# Patient Record
Sex: FEMALE | Race: WHITE | NOT HISPANIC OR LATINO | Employment: OTHER | URBAN - METROPOLITAN AREA
[De-identification: names, ages, dates, MRNs, and addresses within clinical notes are randomized per-mention and may not be internally consistent; named-entity substitution may affect disease eponyms.]

---

## 2021-05-31 ENCOUNTER — RECORDS - HEALTHEAST (OUTPATIENT)
Dept: ADMINISTRATIVE | Facility: CLINIC | Age: 76
End: 2021-05-31

## 2024-10-31 ENCOUNTER — APPOINTMENT (OUTPATIENT)
Dept: CT IMAGING | Facility: CLINIC | Age: 79
DRG: 064 | End: 2024-10-31
Attending: EMERGENCY MEDICINE
Payer: COMMERCIAL

## 2024-10-31 ENCOUNTER — HOSPITAL ENCOUNTER (INPATIENT)
Facility: CLINIC | Age: 79
LOS: 3 days | Discharge: HOSPICE | DRG: 064 | End: 2024-11-03
Attending: EMERGENCY MEDICINE | Admitting: PEDIATRICS
Payer: COMMERCIAL

## 2024-10-31 ENCOUNTER — APPOINTMENT (OUTPATIENT)
Dept: CT IMAGING | Facility: CLINIC | Age: 79
DRG: 064 | End: 2024-10-31
Attending: PHYSICIAN ASSISTANT
Payer: COMMERCIAL

## 2024-10-31 DIAGNOSIS — I61.9 HEMORRHAGIC STROKE (H): ICD-10-CM

## 2024-10-31 DIAGNOSIS — Z66 DNAR (DO NOT ATTEMPT RESUSCITATION): ICD-10-CM

## 2024-10-31 DIAGNOSIS — Z51.5 COMFORT MEASURES ONLY STATUS: ICD-10-CM

## 2024-10-31 DIAGNOSIS — Z87.891 PERSONAL HISTORY OF TOBACCO USE, PRESENTING HAZARDS TO HEALTH: Primary | ICD-10-CM

## 2024-10-31 PROBLEM — D68.2 FACTOR II DEFICIENCY (H): Status: ACTIVE | Noted: 2024-10-31

## 2024-10-31 PROBLEM — F41.0 PANIC ANXIETY SYNDROME: Status: ACTIVE | Noted: 2024-10-31

## 2024-10-31 PROBLEM — J44.9 COPD (CHRONIC OBSTRUCTIVE PULMONARY DISEASE) (H): Status: ACTIVE | Noted: 2024-10-31

## 2024-10-31 PROBLEM — E78.5 HYPERLIPIDEMIA: Status: ACTIVE | Noted: 2024-10-31

## 2024-10-31 PROBLEM — G81.94 LEFT HEMIPLEGIA (H): Status: ACTIVE | Noted: 2024-10-31

## 2024-10-31 PROBLEM — E66.9 OBESITY: Status: ACTIVE | Noted: 2024-10-31

## 2024-10-31 PROBLEM — R73.9 HYPERGLYCEMIA: Status: ACTIVE | Noted: 2024-10-31

## 2024-10-31 PROBLEM — F33.40 RECURRENT MAJOR DEPRESSIVE DISORDER, IN REMISSION (H): Status: ACTIVE | Noted: 2024-10-31

## 2024-10-31 PROBLEM — T45.515A WARFARIN-INDUCED COAGULOPATHY (H): Status: ACTIVE | Noted: 2024-10-31

## 2024-10-31 PROBLEM — I71.43 INFRARENAL ABDOMINAL AORTIC ANEURYSM (AAA) WITHOUT RUPTURE (H): Status: ACTIVE | Noted: 2024-10-31

## 2024-10-31 PROBLEM — D68.32 WARFARIN-INDUCED COAGULOPATHY (H): Status: ACTIVE | Noted: 2024-10-31

## 2024-10-31 PROBLEM — I10 BENIGN ESSENTIAL HYPERTENSION: Status: ACTIVE | Noted: 2024-10-31

## 2024-10-31 PROBLEM — R47.01 EXPRESSIVE APHASIA: Status: ACTIVE | Noted: 2024-10-31

## 2024-10-31 PROBLEM — Z86.718 PERSONAL HISTORY OF VENOUS THROMBOSIS AND EMBOLISM: Status: ACTIVE | Noted: 2024-10-31

## 2024-10-31 PROBLEM — E87.6 HYPOKALEMIA: Status: ACTIVE | Noted: 2024-10-31

## 2024-10-31 PROBLEM — E03.9 HYPOTHYROIDISM: Status: ACTIVE | Noted: 2024-10-31

## 2024-10-31 LAB
ANION GAP SERPL CALCULATED.3IONS-SCNC: 12 MMOL/L (ref 7–15)
APTT PPP: 31 SECONDS (ref 22–38)
BASOPHILS # BLD AUTO: 0 10E3/UL (ref 0–0.2)
BASOPHILS NFR BLD AUTO: 0 %
BUN SERPL-MCNC: 11.3 MG/DL (ref 8–23)
CALCIUM SERPL-MCNC: 9.3 MG/DL (ref 8.8–10.4)
CHLORIDE SERPL-SCNC: 95 MMOL/L (ref 98–107)
CREAT SERPL-MCNC: 0.69 MG/DL (ref 0.51–0.95)
EGFRCR SERPLBLD CKD-EPI 2021: 89 ML/MIN/1.73M2
EOSINOPHIL # BLD AUTO: 0 10E3/UL (ref 0–0.7)
EOSINOPHIL NFR BLD AUTO: 0 %
ERYTHROCYTE [DISTWIDTH] IN BLOOD BY AUTOMATED COUNT: 12.6 % (ref 10–15)
GLUCOSE BLDC GLUCOMTR-MCNC: 163 MG/DL (ref 70–99)
GLUCOSE SERPL-MCNC: 163 MG/DL (ref 70–99)
HCO3 SERPL-SCNC: 29 MMOL/L (ref 22–29)
HCT VFR BLD AUTO: 42.5 % (ref 35–47)
HGB BLD-MCNC: 14.4 G/DL (ref 11.7–15.7)
HOLD SPECIMEN: NORMAL
IMM GRANULOCYTES # BLD: 0.1 10E3/UL
IMM GRANULOCYTES NFR BLD: 1 %
INR PPP: 2.05 (ref 0.85–1.15)
LYMPHOCYTES # BLD AUTO: 1.4 10E3/UL (ref 0.8–5.3)
LYMPHOCYTES NFR BLD AUTO: 20 %
MCH RBC QN AUTO: 33 PG (ref 26.5–33)
MCHC RBC AUTO-ENTMCNC: 33.9 G/DL (ref 31.5–36.5)
MCV RBC AUTO: 98 FL (ref 78–100)
MONOCYTES # BLD AUTO: 1.2 10E3/UL (ref 0–1.3)
MONOCYTES NFR BLD AUTO: 16 %
NEUTROPHILS # BLD AUTO: 4.5 10E3/UL (ref 1.6–8.3)
NEUTROPHILS NFR BLD AUTO: 63 %
NRBC # BLD AUTO: 0 10E3/UL
NRBC BLD AUTO-RTO: 0 /100
PLATELET # BLD AUTO: 213 10E3/UL (ref 150–450)
POTASSIUM SERPL-SCNC: 3.2 MMOL/L (ref 3.4–5.3)
RBC # BLD AUTO: 4.36 10E6/UL (ref 3.8–5.2)
SODIUM SERPL-SCNC: 136 MMOL/L (ref 135–145)
TROPONIN T SERPL HS-MCNC: 9 NG/L
WBC # BLD AUTO: 7.1 10E3/UL (ref 4–11)

## 2024-10-31 PROCEDURE — 85025 COMPLETE CBC W/AUTO DIFF WBC: CPT | Performed by: EMERGENCY MEDICINE

## 2024-10-31 PROCEDURE — 120N000001 HC R&B MED SURG/OB

## 2024-10-31 PROCEDURE — 36415 COLL VENOUS BLD VENIPUNCTURE: CPT | Performed by: EMERGENCY MEDICINE

## 2024-10-31 PROCEDURE — 96375 TX/PRO/DX INJ NEW DRUG ADDON: CPT | Performed by: EMERGENCY MEDICINE

## 2024-10-31 PROCEDURE — 70450 CT HEAD/BRAIN W/O DYE: CPT

## 2024-10-31 PROCEDURE — 258N000003 HC RX IP 258 OP 636: Performed by: EMERGENCY MEDICINE

## 2024-10-31 PROCEDURE — 99418 PROLNG IP/OBS E/M EA 15 MIN: CPT | Performed by: PEDIATRICS

## 2024-10-31 PROCEDURE — 96365 THER/PROPH/DIAG IV INF INIT: CPT | Performed by: EMERGENCY MEDICINE

## 2024-10-31 PROCEDURE — 96366 THER/PROPH/DIAG IV INF ADDON: CPT | Performed by: EMERGENCY MEDICINE

## 2024-10-31 PROCEDURE — 250N000011 HC RX IP 250 OP 636: Performed by: EMERGENCY MEDICINE

## 2024-10-31 PROCEDURE — 250N000013 HC RX MED GY IP 250 OP 250 PS 637: Performed by: PEDIATRICS

## 2024-10-31 PROCEDURE — 999N000123 HC STATISTIC OXYGEN O2DAILY TECH TIME

## 2024-10-31 PROCEDURE — 99223 1ST HOSP IP/OBS HIGH 75: CPT | Performed by: PEDIATRICS

## 2024-10-31 PROCEDURE — 82962 GLUCOSE BLOOD TEST: CPT

## 2024-10-31 PROCEDURE — 84484 ASSAY OF TROPONIN QUANT: CPT | Performed by: EMERGENCY MEDICINE

## 2024-10-31 PROCEDURE — 70450 CT HEAD/BRAIN W/O DYE: CPT | Mod: 77

## 2024-10-31 PROCEDURE — 85730 THROMBOPLASTIN TIME PARTIAL: CPT | Performed by: EMERGENCY MEDICINE

## 2024-10-31 PROCEDURE — 99291 CRITICAL CARE FIRST HOUR: CPT | Mod: 25 | Performed by: EMERGENCY MEDICINE

## 2024-10-31 PROCEDURE — 70496 CT ANGIOGRAPHY HEAD: CPT

## 2024-10-31 PROCEDURE — 85610 PROTHROMBIN TIME: CPT | Performed by: EMERGENCY MEDICINE

## 2024-10-31 PROCEDURE — 99292 CRITICAL CARE ADDL 30 MIN: CPT | Performed by: EMERGENCY MEDICINE

## 2024-10-31 PROCEDURE — 80048 BASIC METABOLIC PNL TOTAL CA: CPT | Performed by: EMERGENCY MEDICINE

## 2024-10-31 PROCEDURE — 250N000011 HC RX IP 250 OP 636: Mod: JZ | Performed by: EMERGENCY MEDICINE

## 2024-10-31 PROCEDURE — 93010 ELECTROCARDIOGRAM REPORT: CPT | Performed by: EMERGENCY MEDICINE

## 2024-10-31 PROCEDURE — 96368 THER/DIAG CONCURRENT INF: CPT | Performed by: EMERGENCY MEDICINE

## 2024-10-31 PROCEDURE — 999N000156 HC STATISTIC RCP CONSULT EA 30 MIN

## 2024-10-31 PROCEDURE — 250N000009 HC RX 250: Performed by: EMERGENCY MEDICINE

## 2024-10-31 PROCEDURE — 250N000011 HC RX IP 250 OP 636: Performed by: PHYSICIAN ASSISTANT

## 2024-10-31 PROCEDURE — 70498 CT ANGIOGRAPHY NECK: CPT

## 2024-10-31 PROCEDURE — G0378 HOSPITAL OBSERVATION PER HR: HCPCS

## 2024-10-31 RX ORDER — LIDOCAINE 40 MG/G
CREAM TOPICAL
Status: DISCONTINUED | OUTPATIENT
Start: 2024-10-31 | End: 2024-11-03 | Stop reason: HOSPADM

## 2024-10-31 RX ORDER — NALOXONE HYDROCHLORIDE 0.4 MG/ML
0.2 INJECTION, SOLUTION INTRAMUSCULAR; INTRAVENOUS; SUBCUTANEOUS
Status: DISCONTINUED | OUTPATIENT
Start: 2024-10-31 | End: 2024-10-31

## 2024-10-31 RX ORDER — BISACODYL 10 MG
10 SUPPOSITORY, RECTAL RECTAL
Status: DISCONTINUED | OUTPATIENT
Start: 2024-11-03 | End: 2024-11-03 | Stop reason: HOSPADM

## 2024-10-31 RX ORDER — WARFARIN SODIUM 3 MG/1
6 TABLET ORAL
Status: ON HOLD | COMMUNITY
Start: 2024-04-16 | End: 2024-11-03

## 2024-10-31 RX ORDER — NALOXONE HYDROCHLORIDE 0.4 MG/ML
0.1 INJECTION, SOLUTION INTRAMUSCULAR; INTRAVENOUS; SUBCUTANEOUS
Status: DISCONTINUED | OUTPATIENT
Start: 2024-10-31 | End: 2024-10-31

## 2024-10-31 RX ORDER — IOPAMIDOL 755 MG/ML
500 INJECTION, SOLUTION INTRAVASCULAR ONCE
Status: COMPLETED | OUTPATIENT
Start: 2024-10-31 | End: 2024-10-31

## 2024-10-31 RX ORDER — LORAZEPAM 2 MG/ML
1 INJECTION INTRAMUSCULAR
Status: DISCONTINUED | OUTPATIENT
Start: 2024-10-31 | End: 2024-10-31

## 2024-10-31 RX ORDER — MORPHINE SULFATE 20 MG/ML
5 SOLUTION ORAL
Status: DISCONTINUED | OUTPATIENT
Start: 2024-10-31 | End: 2024-11-03 | Stop reason: HOSPADM

## 2024-10-31 RX ORDER — NALOXONE HYDROCHLORIDE 0.4 MG/ML
0.2 INJECTION, SOLUTION INTRAMUSCULAR; INTRAVENOUS; SUBCUTANEOUS
Status: DISCONTINUED | OUTPATIENT
Start: 2024-10-31 | End: 2024-11-03 | Stop reason: HOSPADM

## 2024-10-31 RX ORDER — LORAZEPAM 2 MG/ML
1 CONCENTRATE ORAL EVERY 6 HOURS
Status: DISCONTINUED | OUTPATIENT
Start: 2024-10-31 | End: 2024-11-03 | Stop reason: HOSPADM

## 2024-10-31 RX ORDER — GLIPIZIDE 10 MG/1
1-2 TABLET ORAL
Status: DISCONTINUED | OUTPATIENT
Start: 2024-10-31 | End: 2024-11-03 | Stop reason: HOSPADM

## 2024-10-31 RX ORDER — ONDANSETRON 4 MG/1
4 TABLET, ORALLY DISINTEGRATING ORAL EVERY 6 HOURS PRN
Status: DISCONTINUED | OUTPATIENT
Start: 2024-10-31 | End: 2024-11-03 | Stop reason: HOSPADM

## 2024-10-31 RX ORDER — ATROPINE SULFATE 10 MG/ML
2 SOLUTION/ DROPS OPHTHALMIC EVERY 4 HOURS PRN
Status: DISCONTINUED | OUTPATIENT
Start: 2024-10-31 | End: 2024-11-03 | Stop reason: HOSPADM

## 2024-10-31 RX ORDER — MORPHINE SULFATE 20 MG/ML
10 SOLUTION ORAL
Status: DISCONTINUED | OUTPATIENT
Start: 2024-10-31 | End: 2024-11-03 | Stop reason: HOSPADM

## 2024-10-31 RX ORDER — NALOXONE HYDROCHLORIDE 0.4 MG/ML
0.4 INJECTION, SOLUTION INTRAMUSCULAR; INTRAVENOUS; SUBCUTANEOUS
Status: DISCONTINUED | OUTPATIENT
Start: 2024-10-31 | End: 2024-11-03 | Stop reason: HOSPADM

## 2024-10-31 RX ORDER — LORAZEPAM 1 MG/1
1 TABLET ORAL
Status: DISCONTINUED | OUTPATIENT
Start: 2024-10-31 | End: 2024-10-31

## 2024-10-31 RX ORDER — ONDANSETRON 2 MG/ML
4 INJECTION INTRAMUSCULAR; INTRAVENOUS EVERY 6 HOURS PRN
Status: DISCONTINUED | OUTPATIENT
Start: 2024-10-31 | End: 2024-11-03 | Stop reason: HOSPADM

## 2024-10-31 RX ORDER — OLANZAPINE 5 MG/1
5 TABLET, ORALLY DISINTEGRATING ORAL EVERY 6 HOURS PRN
Status: DISCONTINUED | OUTPATIENT
Start: 2024-10-31 | End: 2024-11-03 | Stop reason: HOSPADM

## 2024-10-31 RX ORDER — MANNITOL 20 G/100ML
1 INJECTION, SOLUTION INTRAVENOUS ONCE
Status: COMPLETED | OUTPATIENT
Start: 2024-10-31 | End: 2024-10-31

## 2024-10-31 RX ORDER — LORAZEPAM 2 MG/ML
1 INJECTION INTRAMUSCULAR EVERY 4 HOURS PRN
Status: DISCONTINUED | OUTPATIENT
Start: 2024-10-31 | End: 2024-11-03 | Stop reason: HOSPADM

## 2024-10-31 RX ORDER — GLIPIZIDE 10 MG/1
1-2 TABLET ORAL
Status: DISCONTINUED | OUTPATIENT
Start: 2024-10-31 | End: 2024-10-31

## 2024-10-31 RX ORDER — MINERAL OIL/HYDROPHIL PETROLAT
OINTMENT (GRAM) TOPICAL
Status: DISCONTINUED | OUTPATIENT
Start: 2024-10-31 | End: 2024-10-31

## 2024-10-31 RX ORDER — WARFARIN SODIUM 3 MG/1
3 TABLET ORAL
Status: ON HOLD | COMMUNITY
End: 2024-11-03

## 2024-10-31 RX ORDER — SALIVA STIMULANT COMB. NO.3
1 SPRAY, NON-AEROSOL (ML) MUCOUS MEMBRANE
Status: DISCONTINUED | OUTPATIENT
Start: 2024-10-31 | End: 2024-11-03 | Stop reason: HOSPADM

## 2024-10-31 RX ORDER — MINERAL OIL/HYDROPHIL PETROLAT
OINTMENT (GRAM) TOPICAL
Status: DISCONTINUED | OUTPATIENT
Start: 2024-10-31 | End: 2024-11-03 | Stop reason: HOSPADM

## 2024-10-31 RX ORDER — ACETAMINOPHEN 650 MG/1
650 SUPPOSITORY RECTAL EVERY 6 HOURS PRN
Status: DISCONTINUED | OUTPATIENT
Start: 2024-10-31 | End: 2024-11-03 | Stop reason: HOSPADM

## 2024-10-31 RX ORDER — SENNOSIDES 8.6 MG
1 TABLET ORAL 2 TIMES DAILY PRN
Status: DISCONTINUED | OUTPATIENT
Start: 2024-10-31 | End: 2024-10-31

## 2024-10-31 RX ORDER — BISACODYL 10 MG
10 SUPPOSITORY, RECTAL RECTAL
Status: DISCONTINUED | OUTPATIENT
Start: 2024-11-03 | End: 2024-10-31

## 2024-10-31 RX ADMIN — PHYTONADIONE 10 MG: 10 INJECTION, EMULSION INTRAMUSCULAR; INTRAVENOUS; SUBCUTANEOUS at 10:45

## 2024-10-31 RX ADMIN — LORAZEPAM 1 MG: 2 LIQUID ORAL at 23:10

## 2024-10-31 RX ADMIN — MANNITOL 94.3 G: 20 INJECTION, SOLUTION INTRAVENOUS at 10:47

## 2024-10-31 RX ADMIN — NICARDIPINE HYDROCHLORIDE 2.5 MG/HR: 0.2 INJECTION, SOLUTION INTRAVENOUS at 10:41

## 2024-10-31 RX ADMIN — SODIUM CHLORIDE 100 ML: 9 INJECTION, SOLUTION INTRAVENOUS at 11:00

## 2024-10-31 RX ADMIN — IOPAMIDOL 67 ML: 755 INJECTION, SOLUTION INTRAVENOUS at 11:01

## 2024-10-31 RX ADMIN — PROTHROMBIN, COAGULATION FACTOR VII HUMAN, COAGULATION FACTOR IX HUMAN, COAGULATION FACTOR X HUMAN, PROTEIN C, PROTEIN S HUMAN, AND WATER 2267 UNITS: KIT at 10:33

## 2024-10-31 RX ADMIN — LORAZEPAM 1 MG: 2 LIQUID ORAL at 17:43

## 2024-10-31 ASSESSMENT — ACTIVITIES OF DAILY LIVING (ADL)
ADLS_ACUITY_SCORE: 0

## 2024-10-31 NOTE — LETTER
[address] MEDICAL SURGICAL  [address] [place] [address]  Phone: [phone]    November 1, 2024        Sin Gabe          To whom it may concern:    Please excuse Sin from work today 11/1/24, as a family member is currently hospitalized.      Please contact me for questions or concerns.      Sincerely,      Janis Nguyễn RN

## 2024-10-31 NOTE — PROGRESS NOTES
[facility] Neurosurgery  Telephone Note     Contacted by Dr. Banda at [facility] ED.      79F with last known well 0630 who presented to ED after being found down by daughter. History of HTN on warfarin. INR 2.05. Kcentra and vitamin K administered and mannitol started.      Per ED:  GCS 4 on initial exam with return of gag reflex after mannitol    Imaging:  CT SCAN OF THE HEAD WITHOUT CONTRAST   10/31/2024 10:20 AM      HISTORY: Code Stroke to evaluate for potential thrombolysis and  thrombectomy.      TECHNIQUE:  Axial images of the head and coronal reformations without  IV contrast material. Radiation dose for this scan was reduced using  automated exposure control, adjustment of the mA and/or kV according  to patient size, or iterative reconstruction technique.     COMPARISON: None.     FINDINGS: Large acute parenchymal hematoma centered in the right  frontal lobe measuring 5.6 x 4.7 x 3.8 cm. Surrounding vasogenic edema  resulting in local sulcal effacement and 8 mm leftward midline shift.  There is minimal subarachnoid extension of the hemorrhage into a few  right frontal sulci. Large volume intraventricular extension with  filling of portions of the lateral, third, and fourth ventricles.  Additional hemorrhage extends inferiorly around the medulla and  superior cervical spinal cord and extends through the foramen of  [place] and Luschka. All ventricles appear somewhat dilated relative  to the degree of sulcal widening. Faint periventricular hypodensity is  present along the right frontal and occipital horns, concerning for  early transependymal flow of CSF. Superimposed presumed sequela of  chronic microvascular ischemic changes.     The visualized portions of the sinuses and mastoids appear normal. The  bony calvarium and bones of the skull base appear intact.                                                                       IMPRESSION:     1. Large acute parenchymal hemorrhage  centered in the right frontal  lobe with minimal subarachnoid extension into the adjacent right  frontal sulci and large volume intraventricular hemorrhage.  2. 8 mm leftward midline shift.  3. Ventricular caliber is greater than expected for the degree of  parenchymal volume loss. Faint hypodensity surrounding the frontal and  occipital horns, concerning for early transependymal flow of CSF. This  raises concern for developing obstructive hydrocephalus. Close  attention on follow-up is recommended.     CT ANGIOGRAM OF THE HEAD AND NECK WITH CONTRAST  10/31/2024 11:10 AM      HISTORY: Code stroke to evaluate for potential thrombolysis and  thrombectomy.      TECHNIQUE: CT angiography with an injection of ISOVUE-370, 67mL IV  with scans through the head and neck. Images were transferred to a  separate 3-D workstation where multiplanar reformations and 3-D images  were created. Estimates of carotid stenoses are made relative to the  distal internal carotid artery diameters except as noted. Radiation  dose for this scan was reduced using automated exposure control,  adjustment of the mA and/or kV according to patient size, or iterative  reconstruction technique.     COMPARISON: None.      CT HEAD FINDINGS: No contrast enhancing lesions. Cerebral blood flow  is grossly normal.      CT ANGIOGRAM HEAD FINDINGS: Normal variant fetal origin of the left  posterior cerebral artery. Bilateral carotid siphon atherosclerotic  calcifications without luminal narrowing. The major intracranial  arteries including the proximal branches of the anterior cerebral,  middle cerebral, and posterior cerebral arteries appear patent without  vascular cutoff. No aneurysm identified. A few mildly prominent right  MCA branches are noted along the cortex and course superiorly to a  small area of somewhat tangled vessels, just lateral to the  parenchymal hemorrhage. This measures 13 x 9 mm in axial dimension and  is concerning for a small nidus.  Mildly prominent cortical draining  vein coursing anteriorly.     CT ANGIOGRAM NECK FINDINGS:   Normal origin of the great vessels from the aortic arch.      Right carotid artery: The right common and internal carotid arteries  are patent. Atherosclerotic disease at the carotid bifurcation and  proximal internal carotid artery with less than 50% stenosis by NASCET  criteria.      Left carotid artery: The left common and internal carotid arteries are  patent. Mild atherosclerotic disease at the carotid bifurcation and  proximal internal carotid artery without significant stenosis by  NASCET criteria.      Vertebral arteries: Dominant right vertebral artery. Congenitally  small left vertebral artery is diminutive after giving off the left  posterior inferior cerebellar artery. Vertebral arteries are patent  without evidence of dissection.       Other findings: Lobular opacification in the right lung in a finger in  glove distribution. Additional masslike tissue in the right hilum  suggestive of an enlarged lymph node.                                                                       IMPRESSION:  1. Findings concerning for a small arteriovenous malformation along  the lateral aspect of the right frontal lobe, just lateral to the  parenchymal hemorrhage. Right MCA feeding arteries, and a mildly  prominent draining cortical vein. Given proximity to the hemorrhage,  this is the likely etiology of the hemorrhage. Interventional  radiology consultation for angiogram is recommended for further  evaluation.  2. No large vessel occlusion or hemodynamically significant stenosis  throughout the [place] arteries.  3. No hemodynamically significant stenosis throughout the major neck  arteries.  4. Finger in glove pattern of opacification in the right lung,  concerning for an infectious process such as aspergillosis. Neoplastic  process is not excluded but considered less likely. Dedicated chest CT  is recommended to  evaluate the entire extent not visualized on this  examination.    Plan:   -Goals of care discussion with family as low likelihood of meaningful recovery  -If continuing full cares recommend emergent transfer to [facility] for further management    Discussed with Dr. Nunn and Dr. Banda who were in agreement with the plan.      Kaylene Guevara, [facility] Neurosurgery  [address] [place] [id number]  Tel [phone]  Fax [phone]  Text page via [email or web address] Paging/Directory

## 2024-10-31 NOTE — MEDICATION SCRIBE - ADMISSION MEDICATION HISTORY
Medication Scribe Admission Medication History    Admission medication history is complete. The information provided in this note is only as accurate as the sources available at the time of the update.    Information Source(s):  [facility] Anticoagulation clinic notes via inpatient pharmacy  via phone    Pertinent Information: [facility] [phone] spoke with [facility] Wolfgang    Changes made to PTA medication list:  Added: Warfarin  Deleted: None  Changed: None    Allergies reviewed with patient and updates made in EHR: unable to assess    Medication History Completed By: GIANNA CASAS 10/31/2024 2:00 PM    PTA Med List   Medication Sig Last Dose/Taking    warfarin ANTICOAGULANT (COUMADIN) 3 MG tablet Take 6 mg by mouth Every Mon, Wed, Fri Morning. And take 3mg all other days of the week. Next INR 10/31/24 Taking    warfarin ANTICOAGULANT (COUMADIN) 3 MG tablet Take 3 mg by mouth. Sundays, Tuesdays, Thursdays and Saturdays. Take 6mg M, W, and Fri or as directed. Last INR 10/17/24 was 1.7 Next INR scheduled for 10/31/24. Information obtained from [facility] inpatient pharmacy [phone] Taking

## 2024-10-31 NOTE — ED NOTES
Family present in room and MD talking with all.  Patient is able to squeeze to verbalize answers with right hand,  Remains unable to use left side. Oral airway remains in place and patient is not trying to remove or gagging on due to breathing

## 2024-10-31 NOTE — CONSULTS
[facility]     Stroke Code Note          History of Present Illness     Chief Complaint: Cerebrovascular Accident      Ella Gonzalez is a 79 year old female who presented to the [facility] ED today due to acute onset of flaccid L sided weakness at 0700. Reportedly was up and completely normal immediately prior. In ED she had rapidly declining GCS, monitoring ABCs, severely hypertensive 190/142. No reported trauma. INR 2.05. CTH showed a large R ICH with intraventricular extension and cerebral edema. ED provider as well as our vascular neurology team (myself and Dr. Washington) discussed with patient's daughter Jewels regarding options for treatment with noting that patient's very poor prognosis with an ICH score of 5. Daughter maintains that patient would not want to live with severe disabilities.           Past Medical History     Stroke risk factors: HTN, HLD, thrombophilia with history of PE on warfarin.    Preadmission antithrombotic regimen: warfarin    Modified Clare Score (Pre-morbid)  0 - No symptoms.                   Assessment and Plan       1.  Spontaneous Large R IPH, SAH with cerebral edema and hydrocephalus in setting of HTN and coagulopathy with warfarin     Intravenous Thrombolysis  Not given due to:   - active bleeding  - DOAC dose within 48 hours or INR > 1.7     Endovascular Treatment  not stable enough to undergo CTA, had to pull out for possible intubation however family declined intubation     Plan:  Only if within goals of care (generally if not pursuing intubation with GCS 4 then other medical management would be felt likely futile):  -ICH score 5, GCS 4  -intubation if within goals of care, Emergent transfer [facility] or [facility] and Admit to ICU for close monitoring  -appreciate neurosurgery recommendations and discussion with family regarding any surgical treatment options  -CTA head/neck if stable  -INR 2.05,  reversed with Kcentra and  vitamin K  -Labs: monitor coags  -Neuro checks and vitals every 15 minutes then every 1 hours  -Hold all antiplatelets/anticoagulants/NSAIDs, and pharmacologic VTE prophylaxis  -Blood glucose monitoring, check A1c  -PT/OT/SPT, strict NPO  -check Lipid panel  -Elevate head of bed 30 degrees  -Systolic blood pressure target <140, started nicardipine gtt  -no need for antiseizure medication at this time with no reported seizure activity and no trauma  -Mannitol 1 g/kg given   -repeat CT head wo contrast in 4 hours   -ECG/troponins x 3, telemetry   -Euthermia, euglycemia, eunatremia   -Stroke Education    UPDATE: Per neurosurgery note, poor likelihood for meaningful recovery but if wanting treatment recommended emergent transfer to [facility] if within GOC.     This provider was doing chart review and noted that CTA was able to be performed and showed concern for associated R frontal AVM lateral to IPH with prominent draining cortical vein. Felt to be etiology of hemorrhage. I called Dr. Banda to check further on family GOC. Family was waiting on family member to decide GOC and if going comfort measures only but wanted to hold off for the time being on emergent transfer and neurosurgical intervention pending arrival of that family member. Dr. Banda did relay (as well as our team earlier this AM) that decisions for surgical intervention and intubation are emergent and should not be delayed if that is within GOC. Ella had maintained that patient would not want intervention if poor likelihood at any meaningful recovery.     I did call neuro IR fellow Dr. Natalie Ruiz and relayed the AVM findings and that family GOC are pending however thus far have not wanted intubation/heroic measures.    Discussed with vascular neurology attending, Dr. Washington    ADDENDUM: Received update that family transitioned to compassionate comfort measures only.     ___________________________________________________________________    Yelitza  "[facility]  Vascular Neurology    To page me or covering stroke neurology team member, click here: AMCOM  Choose \"On Call\" tab at top, then select \"NEUROLOGY/ALL SITES\" from middle drop-down box, press Enter, then look for \"stroke\" or \"telestroke\" for your site.  ___________________________________________________________________        Imaging/Labs   (personally reviewed)    CT head:   1. Large acute parenchymal hemorrhage centered in the right frontal  lobe with minimal subarachnoid extension into the adjacent right  frontal sulci and large volume intraventricular hemorrhage.  2. 8 mm leftward midline shift.  3. Ventricular caliber is greater than expected for the degree of  parenchymal volume loss. Faint hypodensity surrounding the frontal and  occipital horns, concerning for early transependymal flow of CSF. This  raises concern for developing obstructive hydrocephalus. Close  attention on follow-up is recommended.    CTA head/neck: deferred given concerning need to intubate         Physical Examination     BP: 125/71   Pulse: 84   Resp: 24           SpO2: 97 %       Weight: 94.3 kg (208 lb)    Wt Readings from Last 2 Encounters:   10/31/24 94.3 kg (208 lb)       Exam not performed given unstable, need for intubation        Stroke Scales     Exam not performed given unstable, need for intubation      Labs     CBC  Lab Results   Component Value Date    HGB 14.4 10/31/2024    HCT 42.5 10/31/2024    WBC 7.1 10/31/2024     10/31/2024       BMP  Lab Results   Component Value Date     10/31/2024    POTASSIUM 3.2 (L) 10/31/2024    CHLORIDE 95 (L) 10/31/2024    CO2 29 10/31/2024    BUN 11.3 10/31/2024    CR 0.69 10/31/2024     (H) 10/31/2024    MANDI 9.3 10/31/2024       INR  INR   Date Value Ref Range Status   10/31/2024 2.05 (H) 0.85 - 1.15 Final       Data   Stroke Code Data  (for stroke code with tele)  Stroke code activated 10/31/24  1012   First stroke provider response 10/31/24  1014   Video " start time 10/31/24  1037   Video end time 10/31/24  (S) 1039 (family not present, called family instead)   Last known normal 10/31/24  0659   Time of discovery (or onset of symptoms)  10/31/24  0700   Head CT read by Stroke Neuro Provider 10/31/24  1022   Was stroke code de-escalated? (S) No (ICH)           Telestroke Service Details  Type of service telemedicine diagnostic assessment of acute neurological changes   Reason telemedicine is appropriate patient requires assessment with a specialist for diagnosis and treatment of neurological symptoms   Mode of transmission secure interactive audio and video communication per Hal   Originating site (patient location) [facility]    Distant site (provider location) [facility]        Clinically Significant Risk Factors Present on Admission              # Coma: based on GCS score of <8  # Compression of brain and # Cerebral edema     Time Spent on this Encounter   Billing: I personally examined and evaluated the patient today. At the time of my evaluation and management the patient was critical condition today due to stroke code, large IPH. I personally managed review of chart, medical record, meds, imaging, history, exam, and discussion with attending regarding plan and documentation. I spent a total of 90 minutes providing critical care services, evaluating the patient, directing care and reviewing laboratory values and radiologic reports.     *All or a portion of this note was generated using voice recognition software and may contain transcription errors.

## 2024-10-31 NOTE — H&P
[facility]    History and Physical - Hospitalist Service       Date of Admission:  10/31/2024    Assessment & Plan      Ella Gonzalez is a 79 year old female with hypertension, history of venous thromboembolism with factor II deficiency for which she takes warfarin anticoagulation chronically, COPD, hypothyroidism, obesity, and chronic anxiety and depression who presented to the ER on 10/31/2024 due to a sudden change in her responsiveness.  After diagnostic evaluation in the ER, patient was found to have intraparenchymal intracranial hemorrhage with extension into the subarachnoid space and cerebral ventricles with vasogenic edema and midline shift.  Clinically, she presents with expressive aphasia and left hemiplegia and had a [place] Coma Scale score as low as 4 in the ER.  After discussion with neurology and neurosurgery as well as with the patient and her family, patient and her family have made decision to proceed with comfort measures only.  Although death is expected, it does not currently appear imminent and the patient is presently responsive.  For these reasons, hospitalization is considered medically necessary.  Based on the presently available information, hospitalization for at least 2 midnights is anticipated.    Principal Problem:    Intracranial hemorrhage, spontaneous intraparenchymal, due to cerebral AVM, acute (H)  Active Problems:    Comfort measures only status    Warfarin-induced coagulopathy (H)    Benign essential hypertension    COPD (chronic obstructive pulmonary disease) (H)    Expressive aphasia    Left hemiplegia (H)    Hyperglycemia    Hypokalemia    Personal history of venous thrombosis and embolism    Hypothyroidism    Infrarenal abdominal aortic aneurysm (AAA) without rupture (H)    Recurrent major depressive disorder, in remission (H)    Panic anxiety syndrome    Hyperlipidemia    Factor II deficiency (H)    Obesity    Spontaneous intracranial  intraparenchymal hemorrhage probably due to AVM  Expressive aphasia and left hemiplegia  Comfort measures only status  Warfarin induced coagulopathy  History of venous thromboembolism and factor II deficiency  Outside medical records refer to previous diagnoses of venous thromboembolism and factor II deficiency for which she is taking warfarin chronically.  Warfarin causes coagulopathy that increases bleeding risk.  Now presenting with acute neurologic deterioration with initial signs of coma with GCS as low as 4 in the ER but now improved responsiveness although ongoing expressive aphasia and left hemiplegia.  Radiographic findings demonstrate large right-sided intracranial intraparenchymal hemorrhage with mass effect including probable early obstructive hydrocephalus.  Intracranial hemorrhage appears to be associated with bleeding from AVM based on CTA radiographic findings.  INR was therapeutic at 2.0 from chronic warfarin therapy.  Despite initial treatments in the ER, radiographic findings deteriorated.  Both neurology and neurosurgery were consulted in the ER and after discussion with the patient and her family, patient and family clearly expressed preference for comfort measures only and no aggressive interventions.  Although this event is expected to be a terminal event, patient does not presently appear to be dying imminently.  -Admit to inpatient  -N.p.o. except may take sublingual medication, could consider bedside dysphagia evaluation by nursing depending upon clinical course over the next 24 hours  -Not monitoring vital signs or neurologic status closely because of preference for comfort measures  -Start scheduled dose of sublingual lorazepam for seizure prophylaxis  -May use additional doses of IV lorazepam as needed for actual seizure  -Ordered sublingual Roxanol as needed for pain or dyspnea  -ordered sublingual Zyprexa as needed for agitation or delirium  -Ordered oxygen as needed for  dyspnea  -Palliative care consult ordered  -Depending upon clinical course over the first 24 to 48 hours, anticipate possible consultation with case management and/or hospice for disposition planning if death is not imminent  -Not continuing chronic medications except those as needed for comfort such as inhaled medication for COPD  -Not continuing warfarin or monitoring INR    Hypertension  Carries diagnosis of essential hypertension chronically treated with losartan, metoprolol tartrate, and hydrochlorothiazide according to outside medical records.  In context of acute intracranial hemorrhage, presented with severe hypertension blood pressure as high as 203/99 in ER.  While undergoing diagnostic evaluation and clarification of treatment plan, he was transiently treated with nicardipine infusion in ER with recovery of blood pressure to the normal range.  Once decision was made to proceed with comfort measures, nicardipine infusion was stopped since which time blood pressure has been mildly elevated.  -Not continuing chronic antihypertensive medications due to preference for comfort measures  -Not monitoring blood pressure due to preference for comfort measures    COPD  Respiratory distress  Abnormal lung findings on head and neck CTA  Known COPD and panlobular emphysema chronically treated with inhaled therapies including Spiriva and albuterol.  She does not require oxygen supplementation chronically.  Now presenting with clinical signs of respiratory distress in the context of life-threatening acute illness.  So far, she has not had signs of COPD exacerbation.  Incidentally, she was reported to have abnormal lung findings in the right lung with masslike tissue in the right hilum on head and neck CTA.  -Ordered DuoNeb 4 times daily  -Use supplemental oxygen as needed and titrated for comfort  -Not pursuing additional evaluation for potential treatment of incidental abnormal radiographic findings in the lung and  right hilum because of preference for comfort measures only    Hyperglycemia  Hypochloremia  Hypokalemia  In the ER she was incidentally found to have potassium 3.2, chloride 95, and random blood sugar 163 concerning for hypokalemia, hypochloremia, and hyperglycemia.  Suspect electrolyte abnormalities are due to chronic hydrochlorothiazide diuretic therapy.  She is not known to have diabetes, so moderate hyperglycemia could be due to physiologic stress.  -Because of preference for comfort measures only, not continuing to monitor electrolytes or treat hypokalemia, hypochloremia, or hyperglycemia    Hypothyroidism  Chronic hypothyroidism normally treated with levothyroxine.  Currently n.p.o. due to intracranial bleeding with stroke symptoms with currently guarded prognosis for survival beyond more than a week or two  -Not continuing levothyroxine for now    Anxiety and depression  According to outside medical records, has known chronic anxiety treated with lorazepam as needed and known chronic depression treated with Remeron and possibly fluoxetine.  Current mood difficult to assess in the context of life-threatening acute illness and she presently is n.p.o.  -Not continuing chronic Remeron or fluoxetine at this time  -Treating for comfort as outlined above    Infrarenal abdominal aortic aneurysm  Hyperlipidemia  Known chronic hyperlipidemia and infrarenal abdominal aortic aneurysm.  Takes a statin chronically but is presently n.p.o.  -Not continuing statin at this time due to preference for comfort measures    Obesity  Appears obese with BMI 38 at recent office visit on October 17.  -No acute intervention anticipated        Diet: NPO for Medical/Clinical Reasons Except for: No Exceptions    DVT Prophylaxis: VTE Prophylaxis contraindicated due to preference for comfort measures only  Salomon Catheter: Not present  Lines: None     Cardiac Monitoring: None  Code Status: No CPR- Do NOT Intubate      Clinically Significant  Risk Factors Present on Admission        # Hypokalemia: Lowest K = 3.2 mmol/L in last 2 days, will replace as needed   # Hypochloremia: Lowest Cl = 95 mmol/L in last 2 days, will monitor as appropriate       # Drug Induced Coagulation Defect: home medication list includes an anticoagulant medication    # Hypertension: Noted on problem list                    Disposition Plan     Medically Ready for Discharge: Anticipated in 2-4 Days           Travis Ford MD  Hospitalist Service  [facility]  Securely message with [employer] (more info)  Text page via [employer] Paging/Directory     ______________________________________________________________________    Chief Complaint   Decreased responsiveness    History is obtained from the electronic health record, emergency department physician, and patient's family    History of Present Illness   Ella Gonzalez is a 79 year old female who lives with her daughter Jewels and seemed to be interacting normally about 6:30 AM this morning when her daughter spoke with her.  Her daughter went outside to have a cigarette and when she returned into the house, she tried talking to her mother and realized that her mother was not responding verbally which was very unusual.  She then approached her mother and realized that her mother was not interacting normally and was much less responsive.  She was able to make some vocalizations sounds that did not make sense.  She had maintained her seated posture in a chair.  However, due to this abrupt change, her daughter called 911.  Patient was transported to the ER for evaluation.    In the ER, Dr. Banda reports that the patient presented with clinical signs of stroke including aphasia and left hemiplegia.  At one point she was poorly responsive in the ER with GCS as low as 4.  Dr. Banda reports that the patient was given Kcentra and vitamin K.  Patient was severely hypertensive and was initially started on  nicardipine infusion.  Radiographic findings demonstrated large intracranial hemorrhage with radiographic signs of mass effect.  Patient was given a dose of mannitol after which she started to have good urine output.  She then seemed to become more responsive.  Both neurology and neurosurgery was consulted.  Dr. Banda reports that multiple discussions were held with the patient and her family in the ER today.  Ultimately, patient herself and family expressed clear preference to avoid aggressive intervention and proceed only with comfort measures.  Patient is now seen in the ER for evaluation.      Past Medical History    Outside medical records reviewed with listed diagnoses including the following: History of venous thromboembolism with factor II deficiency, COPD, hypertension, hyperlipidemia, approximately 3 cm infrarenal abdominal aortic aneurysm, anxiety, and depression    Past Surgical History   Outside medical records reviewed with listed previous surgeries including the following: Hysterectomy, cholecystectomy, partial thyroidectomy followed by thyroid radioiodine ablation    Prior to Admission Medications   Prior to Admission Medications   Prescriptions Last Dose Informant Patient Reported? Taking?   warfarin ANTICOAGULANT (COUMADIN) 3 MG tablet   Yes Yes   Sig: Take 6 mg by mouth Every Mon, Wed, Fri Morning. And take 3mg all other days of the week. Next INR 10/31/24   warfarin ANTICOAGULANT (COUMADIN) 3 MG tablet   Yes Yes   Sig: Take 3 mg by mouth. Sundays, Tuesdays, Thursdays and Saturdays. Take 6mg M, W, and Fri or as directed. Last INR 10/17/24 was 1.7 Next INR scheduled for 10/31/24. Information obtained from [facility] inpatient pharmacy [phone]      Facility-Administered Medications: None      Other medications listed in outside medical records include the following:  Hydrochlorothiazide 25 mg daily  Losartan (unclear whether 50 mg or 100 mg daily because she appears to have had recent  prescriptions for each dose)  Metoprolol tartrate 25 mg twice daily  Spiriva 1 inhalation daily  Albuterol inhaler 2 puffs every 4 hours  Pravastatin 20 mg daily   Levothyroxine 100 mcg daily  Remeron 30 mg daily  Possibly fluoxetine 40 mg daily  Lorazepam 1 mg 3 times daily as needed  Trazodone 200 mg at bedtime as needed for insomnia        Review of Systems    The 10 point Review of Systems is negative other than noted in the HPI or here.     Social History   I have reviewed this patient's social history and updated it with pertinent information if needed.  Patient has been living at home with her daughter.  Patient smokes cigarettes for many years but quit in 2014.         Family History     No known family history of stroke      Allergies   According to outside medical records, patient had previous adverse reaction to amoxicillin and cephalexin each with hives.  She also had unknown reaction to risperidone and breathing difficulty from pindolol.       Physical Exam   Vital Signs:     BP: (!) 147/98 Pulse: 71   Resp: 19 SpO2: 96 %      Patient Vitals for the past 24 hrs:   BP Pulse Resp SpO2 Weight   10/31/24 1515 (!) 147/98 71 19 96 % --   10/31/24 1500 (!) 144/84 68 24 97 % --   10/31/24 1445 (!) 146/81 75 21 97 % --   10/31/24 1430 (!) 148/89 75 24 96 % --   10/31/24 1415 139/86 79 25 97 % --   10/31/24 1400 (!) 151/91 78 23 97 % --   10/31/24 1345 138/76 79 -- 96 % --   10/31/24 1315 (!) 112/96 88 29 96 % --   10/31/24 1300 125/71 84 24 97 % --   10/31/24 1250 136/83 85 24 92 % --   10/31/24 1230 115/84 84 23 98 % --   10/31/24 1215 118/83 84 25 96 % --   10/31/24 1200 (!) 153/52 84 25 98 % --   10/31/24 1145 136/79 81 26 96 % --   10/31/24 1140 125/71 80 24 98 % --   10/31/24 1130 130/76 78 21 98 % --   10/31/24 1129 (!) 124/92 79 23 98 % --   10/31/24 1120 121/84 78 26 98 % --   10/31/24 1115 139/77 77 26 96 % --   10/31/24 1113 139/77 79 25 96 % --   10/31/24 1108 (!) 140/90 77 25 96 % --   10/31/24 1058  (!) 145/67 69 26 98 % --   10/31/24 1048 (!) 203/99 60 26 99 % --   10/31/24 1040 (!) 194/100 57 21 100 % --   10/31/24 1030 (!) 185/98 57 30 100 % --   10/31/24 1012 (!) 190/142 -- -- -- 94.3 kg (208 lb)     Weight: 208 lbs 0 oz       Constitutional: Obese elderly woman lying with eyes closed in bed  Eyes: Grossly normal eyes, symmetric moderately dilated pupils both reactive to light   ENT: No signs of recent head injury, clear ear canals and nares, mouth appears edentulous with moist mucous membranes  Hematologic / Lymphatic: no cervical lymphadenopathy and no supraclavicular lymphadenopathy  Respiratory: Mildly tachypneic with supraclavicular retractions, diminished breath sounds throughout, inspiratory crackles on the left, right lung sounds clear, no wheezing  Cardiovascular: Regular rate and rhythm, no loud murmur, good radial pulse, brisk capillary refill  GI: Obese abdomen, normal bowel sounds, soft, no apparent tenderness  Skin: Normal color, no rash  Musculoskeletal: No gross acute limb anomalies, no lower extremity cords appreciated  Neurologic: Resting with eyes closed but when asked to open eyes she appears to attempt to open her eyes spontaneously, also attempts to respond vocally to questions but her verbal utterances are incomprehensible although she seems to be able to nod and shake her head appropriately in response to yes and no questions, obvious left hemiplegia, purposefully moving and using right upper extremity, able to move right lower extremity although seems weaker than upper extremity, no involuntary movements, normal DTRs in the right upper and lower extremity but unable to obtain on the left, no clonus  Neuropsychiatric: Difficult to assess because she is resting with eyes closed but obviously can respond to verbal prompting    Medical Decision Making       106 MINUTES SPENT BY ME on the date of service doing chart review, history, exam, documentation & further activities per the note.       This included time spent reviewing outside medical records from [facility] via care everywhere including medical and surgical histories and recent medication list    Data     I have personally reviewed the following data over the past 24 hrs:    7.1  \   14.4   / 213     136 95 (L) 11.3 /  163 (H)   3.2 (L) 29 0.69 \     Trop: 9 BNP: N/A     INR:  2.05 (H) PTT:  31   D-dimer:  N/A Fibrinogen:  N/A       Imaging results reviewed over the past 24 hrs:   Recent Results (from the past 24 hours)   CT Head w/o Contrast    Narrative    CT SCAN OF THE HEAD WITHOUT CONTRAST   10/31/2024 10:20 AM     HISTORY: Code Stroke to evaluate for potential thrombolysis and  thrombectomy.     TECHNIQUE:  Axial images of the head and coronal reformations without  IV contrast material. Radiation dose for this scan was reduced using  automated exposure control, adjustment of the mA and/or kV according  to patient size, or iterative reconstruction technique.    COMPARISON: None.    FINDINGS: Large acute parenchymal hematoma centered in the right  frontal lobe measuring 5.6 x 4.7 x 3.8 cm. Surrounding vasogenic edema  resulting in local sulcal effacement and 8 mm leftward midline shift.  There is minimal subarachnoid extension of the hemorrhage into a few  right frontal sulci. Large volume intraventricular extension with  filling of portions of the lateral, third, and fourth ventricles.  Additional hemorrhage extends inferiorly around the medulla and  superior cervical spinal cord and extends through the foramen of  [place] and [place]. All ventricles appear somewhat dilated relative  to the degree of sulcal widening. Faint periventricular hypodensity is  present along the right frontal and occipital horns, concerning for  early transependymal flow of CSF. Superimposed presumed sequela of  chronic microvascular ischemic changes.    The visualized portions of the sinuses and mastoids appear normal. The  bony  calvarium and bones of the skull base appear intact.       Impression    IMPRESSION:     1. Large acute parenchymal hemorrhage centered in the right frontal  lobe with minimal subarachnoid extension into the adjacent right  frontal sulci and large volume intraventricular hemorrhage.  2. 8 mm leftward midline shift.  3. Ventricular caliber is greater than expected for the degree of  parenchymal volume loss. Faint hypodensity surrounding the frontal and  occipital horns, concerning for early transependymal flow of CSF. This  raises concern for developing obstructive hydrocephalus. Close  attention on follow-up is recommended.    These findings were communicated by phone to Dr. Banda at 10:35 AM  on 10/31/2024.    CATHIE HERNANDES MD         SYSTEM ID:  [id number]   CTA Head Neck with Contrast    Narrative    CT ANGIOGRAM OF THE HEAD AND NECK WITH CONTRAST  10/31/2024 11:10 AM     HISTORY: Code stroke to evaluate for potential thrombolysis and  thrombectomy.     TECHNIQUE: CT angiography with an injection of [id number]-370, 67mL IV  with scans through the head and neck. Images were transferred to a  separate 3-D workstation where multiplanar reformations and 3-D images  were created. Estimates of carotid stenoses are made relative to the  distal internal carotid artery diameters except as noted. Radiation  dose for this scan was reduced using automated exposure control,  adjustment of the mA and/or kV according to patient size, or iterative  reconstruction technique.    COMPARISON: None.     CT HEAD FINDINGS: No contrast enhancing lesions. Cerebral blood flow  is grossly normal.     CT ANGIOGRAM HEAD FINDINGS: Normal variant fetal origin of the left  posterior cerebral artery. Bilateral carotid siphon atherosclerotic  calcifications without luminal narrowing. The major intracranial  arteries including the proximal branches of the anterior cerebral,  middle cerebral, and posterior cerebral arteries appear patent  without  vascular cutoff. No aneurysm identified. A few mildly prominent right  MCA branches are noted along the cortex and course superiorly to a  small area of somewhat tangled vessels, just lateral to the  parenchymal hemorrhage. This measures 13 x 9 mm in axial dimension and  is concerning for a small nidus. Mildly prominent cortical draining  vein coursing anteriorly.    CT ANGIOGRAM NECK FINDINGS:   Normal origin of the great vessels from the aortic arch.     Right carotid artery: The right common and internal carotid arteries  are patent. Atherosclerotic disease at the carotid bifurcation and  proximal internal carotid artery with less than 50% stenosis by NASCET  criteria.     Left carotid artery: The left common and internal carotid arteries are  patent. Mild atherosclerotic disease at the carotid bifurcation and  proximal internal carotid artery without significant stenosis by  NASCET criteria.     Vertebral arteries: Dominant right vertebral artery. Congenitally  small left vertebral artery is diminutive after giving off the left  posterior inferior cerebellar artery. Vertebral arteries are patent  without evidence of dissection.      Other findings: Lobular opacification in the right lung in a finger in  glove distribution. Additional masslike tissue in the right hilum  suggestive of an enlarged lymph node.       Impression    IMPRESSION:  1. Findings concerning for a small arteriovenous malformation along  the lateral aspect of the right frontal lobe, just lateral to the  parenchymal hemorrhage. Right MCA feeding arteries, and a mildly  prominent draining cortical vein. Given proximity to the hemorrhage,  this is the likely etiology of the hemorrhage. Interventional  radiology consultation for angiogram is recommended for further  evaluation.  2. No large vessel occlusion or hemodynamically significant stenosis  throughout the [place] arteries.  3. No hemodynamically significant stenosis  throughout the major neck  arteries.  4. Finger in glove pattern of opacification in the right lung,  concerning for an infectious process such as aspergillosis. Neoplastic  process is not excluded but considered less likely. Dedicated chest CT  is recommended to evaluate the entire extent not visualized on this  examination.    These findings were communicated by phone to Dr. Banda at 11:52 AM  on 10/31/2024.    CATHIE HERNANDES MD         SYSTEM ID:  [id number]   CT Head w/o Contrast    Narrative    CT HEAD WITHOUT CONTRAST  October 31, 2024 2:52 PM     HISTORY: Only if within GOC: Four hour repeat CTH for stability,  followup on edema.       COMPARISON: 10/31/2024 at 1021 hours.    TECHNIQUE: Using multidetector thin collimation helical acquisition  technique, axial, coronal and sagittal CT images from the skull base  to the vertex were obtained without intravenous contrast.   (topogram) image(s) also obtained and reviewed. Dose reduction  techniques were used.      Impression    IMPRESSION:   1.  Intraparenchymal component of the right frontal lobe hemorrhage  measures 47 x 51 x 67 mm, slightly increased since comparison when  measured 47 x 46 x 64 mm. Degree of surrounding vasogenic edema and  right hemispheric sulcal effacement is similar. Leftward midline shift  measuring 7-8 mm is similar. Similar extensive intraventricular  hemorrhage throughout the lateral, third and fourth ventricles with  associated findings of obstructive hydrocephalus.  2.  Stable orbital structures, bony calvarium, paranasal sinuses and  mastoid air cells.    Findings discussed with Dr. Banda by me at 1457 hours on 10/31/2024.    NUNU CONNER DO         SYSTEM ID:  [id number]     Recent Labs   Lab 10/31/24  1025 10/31/24  1010   WBC 7.1  --    HGB 14.4  --    MCV 98  --      --    INR 2.05*  --      --    POTASSIUM 3.2*  --    CHLORIDE 95*  --    CO2 29  --    BUN 11.3  --    CR 0.69  --    ANIONGAP 12  --    MANDI  9.3  --    * 163*

## 2024-10-31 NOTE — PROGRESS NOTES
S- Respiratory Therapy  B- Airway  A- Monitored airway upon admission and during CT scan.  Oxygen applied via nasal cannula SpO2 =100%.  Breath sounds clear and equal.  Initial debbie reflex.  Post CT no debbie reflex,  after talking with family and patients code status, no intubation desired.  Oral airway inserted now with no debbie reflex.   R- RCP will follow.

## 2024-10-31 NOTE — ED PROVIDER NOTES
History     Chief Complaint   Patient presents with    Cerebrovascular Accident     HPI  Ella Gonzalez is a 79 year old female who presents code stroke.  Notified about 10 minutes prior to arrival.  Code stroke team assembled with report from EMS that GCS = 4.    I spoke with daughter Jewels at [phone].  Patient lives with daughter.  Daughter talked with mother at 630 this morning.  She was awake, alert talkative.  Daughter came in from the garage after having a cigarette.  Mother was unresponsive facial droop on the left and flaccid left arm.  This was around 7 AM.  EMS notified.  Upon assessment they noted flaccid left side unresponsive.  Pupils equal dilated.  Estimated GCS 4-5.    Patient long-term anticoagulation with warfarin.  Uncertain his last INR.  Family confirmed no trauma such as falling.    Additional past medical history available limited through care everywhere.  Not a [facility] patient.    Spoke with sima Donis    Allergies:  Not on File    Problem List:    There are no active problems to display for this patient.       Past Medical History:    No past medical history on file.    Past Surgical History:    No past surgical history on file.    Family History:    No family history on file.    Social History:  Marital Status:   [2]        Medications:    No current outpatient medications on file.        Review of Systems  Unable to obtain  Physical Exam   BP: (!) 190/142  Weight: 94.3 kg (208 lb)      Physical Exam  Nursing notes reviewed  Hypertensive  Pupils 6 mm equal/dilated  Nondiscernible moaning  Did slightly attempt to move her lips when I asked what her name was suggested that she was hearing  Left facial droop  Positive gag reflex  Left side arm and leg flaccid    ED Course        Procedures                  Results for orders placed or performed during the hospital encounter of 10/31/24 (from the past 24 hours)   CBC with Platelets & Differential    Narrative    The following  orders were created for panel order CBC with Platelets & Differential.  Procedure                               Abnormality         Status                     ---------                               -----------         ------                     CBC with platelets and d...[[id number]]                      Final result                 Please view results for these tests on the individual orders.   CBC with platelets and differential   Result Value Ref Range    WBC Count 7.1 4.0 - 11.0 10e3/uL    RBC Count 4.36 3.80 - 5.20 10e6/uL    Hemoglobin 14.4 11.7 - 15.7 g/dL    Hematocrit 42.5 35.0 - 47.0 %    MCV 98 78 - 100 fL    MCH 33.0 26.5 - 33.0 pg    MCHC 33.9 31.5 - 36.5 g/dL    RDW 12.6 10.0 - 15.0 %    Platelet Count 213 150 - 450 10e3/uL    % Neutrophils 63 %    % Lymphocytes 20 %    % Monocytes 16 %    % Eosinophils 0 %    % Basophils 0 %    % Immature Granulocytes 1 %    NRBCs per 100 WBC 0 <1 /100    Absolute Neutrophils 4.5 1.6 - 8.3 10e3/uL    Absolute Lymphocytes 1.4 0.8 - 5.3 10e3/uL    Absolute Monocytes 1.2 0.0 - 1.3 10e3/uL    Absolute Eosinophils 0.0 0.0 - 0.7 10e3/uL    Absolute Basophils 0.0 0.0 - 0.2 10e3/uL    Absolute Immature Granulocytes 0.1 <=0.4 10e3/uL    Absolute NRBCs 0.0 10e3/uL   Extra Tube ([facility] Draw)    Narrative    The following orders were created for panel order Extra Tube ([facility] Draw).  Procedure                               Abnormality         Status                     ---------                               -----------         ------                     Extra Purple Top Tube[[id number]]                                                       Extra Heparinized Syringe[[id number]]                        Final result                 Please view results for these tests on the individual orders.   Extra Heparinized Syringe   Result Value Ref Range    Hold Specimen hold        Medications   sodium chloride 0.9 % bag for CT scan flush use (has no administration in time range)   iopamidol  (ISOVUE-370) solution 500 mL (has no administration in time range)   HOLD: warfarin (COUMADIN) therapy (has no administration in time range)   phytonadione (AQUAMEPHYTON, VITAMIN K) 10 mg in sodium chloride 0.9 % 50 mL intermittent infusion (has no administration in time range)   prothrombin 4 factor complex concentrate (KCENTRA) infusion 2,358 Units (has no administration in time range)       Assessments & Plan (with Medical Decision Making)  Ella is 79 years of age.  We have very limited health history other than what is available through Care Everywhere.  Doctors outside the [facility] system.  On daughter was with mother at 630 this morning and had a normal wellness.  Daughter left the house to go to the garage to smoke.  Came back and noted mother unresponsive, left facial droop, left arm flaccid.  This was around 7 AM.  Called EMS.  EMS arrived and noted stroke concerns and alerted us to code stroke transfer.  Delay with bad weather and heavy snow.  They did report that GCS was declining patient was in a sinus rhythm and hypertensive.  The estimated GCS = 4.  Stroke team was assembled in room 6 prior to ED arrival.  This included RT and pharmacy.  When patient arrived pupils were 6 mm equal/dilated.  For the most part unresponsive but she did have some discernible moaning.  Look like she may be try to move some lips when I asked what her name was.  Would not follow any other commands.  Flaccid left side.  Positive gag.  Noncontrast CT confirmed a massive bleed on the right.  There looks to be there are some intraventricular bleeding extension and looks to be some dilated ventricles which could be due to bleed versus some previous and hydrocephaly.  I had reached the daughter Jewels at her home.  She was hysterical trying to make decisions for her mother's wellness.  We reviewed her advanced care directive that we could obtain through care everywhere.  There appeared to be a consensus that mother would not want  "heroic measures if there was poor chance for any meaningful recovery and restoration of her life functionality.  At this time we mutually agreed to not put her on \"life support with intubation mechanical ventilation.  I have been in contact with neurology.  And waiting to hear back from neurosurgery.  Spoke to the radiologist.  We are recommending mannitol, nicardipine drip to bring systolic blood pressure under 140 and reversing agents with Kcentra and vitamin K.  Kcentra was dosed for an INR less than 4 once INR is back we can increase the number of units provided but wanted to start without delay.  At this time we will proceed with medical management and recommendations of neurosurgery.  There is a snowstorm which will ground to air care and will significantly delay transfer to a tertiary center.  At this time no intubation mechanical ventilation.  Hoping that daughter can arrive at bedside.  I believe neurology is also can reach out and talk with Jewels at her phone number of [phone].  3:42 PM  Since the last time of documentation of had a chance to meet with multiple family members, multiple conversations with radiology, neurosurgery and stroke care team at the [facility].  Appreciate their input.  Given the catastrophic amount of bleeding and with recent CT showing evidence for further extension family has made the decision to switch to comfort care.  They believe this in the best interest of the patient without be her wishes.  There was a time where she was showing.  I spoke with Dr. Ford and he will transition care to the floor under comfort care/DNR/DNI.  I spent time talk to the family about what comfort care entails.  Total time 1:1=120 minutes     I have reviewed the nursing notes.    I have reviewed the findings, diagnosis, plan and need for follow up with the patient.          New Prescriptions    No medications on file       Final diagnoses:   Hemorrhagic stroke (H)   Transition to " comfort care/DNR/DNI    10/31/2024   [facility] EMERGENCY DEPT       Santiago Banda,   10/31/24 1547

## 2024-11-01 PROCEDURE — 99232 SBSQ HOSP IP/OBS MODERATE 35: CPT | Performed by: PEDIATRICS

## 2024-11-01 PROCEDURE — 250N000013 HC RX MED GY IP 250 OP 250 PS 637: Performed by: PEDIATRICS

## 2024-11-01 PROCEDURE — 120N000001 HC R&B MED SURG/OB

## 2024-11-01 RX ADMIN — MORPHINE SULFATE 10 MG: 10 SOLUTION ORAL at 11:18

## 2024-11-01 RX ADMIN — LORAZEPAM 1 MG: 2 LIQUID ORAL at 19:30

## 2024-11-01 RX ADMIN — DEXTRAN 70, GLYCERIN, HYPROMELLOSE 1 DROP: 1; 2; 3 SOLUTION/ DROPS OPHTHALMIC at 19:30

## 2024-11-01 RX ADMIN — LORAZEPAM 1 MG: 2 LIQUID ORAL at 14:51

## 2024-11-01 RX ADMIN — LORAZEPAM 1 MG: 2 LIQUID ORAL at 05:04

## 2024-11-01 NOTE — PLAN OF CARE
"Goal Outcome Evaluation:      Plan of Care Reviewed With: patient, family    Overall Patient Progress: no change    Outcome Evaluation: Pt used hand squeezing to communicate when asked yes/no questions. Indicated she was not in pain and was comfortable each time asked. Pt repositioned occasionally to maintain comfort. Pt did answer \"no\" verbally a couple times when asked questions. Nurse called daughter to share that family could visit anytime and completed pt's admission with daughter. No new concerns noted.      "

## 2024-11-01 NOTE — PLAN OF CARE
Goal Outcome Evaluation:      Plan of Care Reviewed With: patient, family    Overall Patient Progress: no changeOverall Patient Progress: no change    Outcome Evaluation: pt has been lethargic, able to squeeze writers hand today during cares. intermittently opens eyes. repositioned every 2-3hrs. pt not alert/awake to follow directions for bedside swallow eval. Family present during the day.

## 2024-11-01 NOTE — PROGRESS NOTES
"Pt on comfort cares.    Pt unable to open eyes upon admission but is able to squeeze hand to say \"yes\" to yes and no questions appropriately. Able to hold onto bed side rail with R hand when rolling on side. No speech. No vitals per comfort cares. Not in pain per PAINAID and patient did not squeeze \"yes\" when asked if she has pain.  Given scheduled sublingual Ativan prophylacticly for comfort.    Pt received water swabs but otherwise NPO except for sublingual medications.  Total care, repositioned for comfort, purwick in place.  Pt's skin intact.      Sallie Ferguson RN on 10/31/2024 at 8:29 PM    "

## 2024-11-01 NOTE — CONSULTS
"SPIRITUAL HEALTH SERVICES Consult Note    Medical Surgical Unit at [facility].      REFERRAL SOURCE/REASON FOR VISIT - Saw patient per referral by Physician for impending death.    SUMMARY - I visited Ella's family members, who were open to my introduction and to emotional and spiritual support.  A brother, sister-in-law, and granddaughter were present when I arrived.  They reported that they just found out this morning \"that it was this serious\" with Ella.  Another sister and other family members arrived soon after.  Most were teary-eyed.  I offered empathy, a listening ear, and reassurance.         Plan - I will continue to follow patient and be available for any ongoing support needs until her death or discharge. I asked several family members to ask for me if there was a desire for more support today.    Yonathan Greco, Ph.D, [facility]  [address] [place] [address]  [phone]  [email or web address]    Assessment -     Patient/Family Understanding of Illness and Goals of Care - Family understands some of patient's illness and her goals.    Strengths, Coping, and Resources - family    Meaning, Beliefs, and Spirituality - Patient did not designate a Pentecostal preference at admission.  A brother indicated that Ella was raised Taoist, but is not practicing.          "

## 2024-11-01 NOTE — PROGRESS NOTES
"S-(situation): Patient registered to Observation. Patient arrived to room 257 via stretcher from ED RM 6    B-(background): Pt found to have L side facial droop and L paralysis, brought to ED where it was found she had a large hemorrhagic stroke.  Pt and pt's family made decision the decision to go on comfort cares.    A-(assessment): Pt unable to open eyes upon admission but is able to squeeze hand to say \"yes\" to yes and no questions appropriately.  Able to hold onto bed side rail with R hand when rolling on side.  No speech.  No vitals per comfort cares.  Not in pain per PAINAID and patient did not squeeze \"yes\" when questions.    R-(recommendations): Orders and observation goals reviewed with patient (family not available at time of admission).    Nursing Observation criteria listed below was met:    Skin issues/needs documented:NA  Isolation needs addressed and Signage up: NA  Fall Prevention: Education given and documented: No, pt unable to receive education at this time, family not available and unable to call at this time  Education Assessment documented:No pt unable to receive education at this time, family not available and unable to call at this time  Admission Education Documented: No pt unable to receive education at this time, family not available and unable to call at this time  New medication patient education completed and documented (Possible Side Effects of Common Medications handout): No pt unable to receive education at this time, family not available and unable to call at this time  OBS video/handout Reviewed & Documented: No pt unable to receive education at this time, family not available and unable to call at this time  Allergies Reviewed: No pt unable to receive education at this time, family not available and unable to call at this time  Medication Reconciliation Complete: No  Home medications if not able to send immediately home with family stored here: NA  Reminder note placed in " discharge instructions of home meds: NA  Patient has discharge needs (If yes, please explain): No  Patient discharge preferences addressed and charted on white board:  No  Provider notified that patient has arrived to the unit: Yes    Sallie Ferguson RN on 10/31/2024 at 8:25 PM

## 2024-11-01 NOTE — PROGRESS NOTES
[facility]    Medicine Progress Note - Hospitalist Service    Date of Admission:  10/31/2024    Assessment & Plan      Ella Gonzalez is a 79 year old female with hypertension, history of venous thromboembolism with factor II deficiency for which she had been taking warfarin anticoagulation chronically, COPD, hypothyroidism, obesity, and chronic anxiety and depression who presented to the ER on 10/31/2024 due to a sudden change in her responsiveness.  After diagnostic evaluation in the ER, patient was found to have intraparenchymal intracranial hemorrhage with extension into the subarachnoid space and cerebral ventricles with vasogenic edema and midline shift.  Clinically, she presented with expressive aphasia and left hemiplegia and had a Hadley Coma Scale score as low as 4 in the ER.  After discussion with neurology and neurosurgery as well as with the patient and her family, patient and her family expressed preference for comfort measures only rather than aggressive additional evaluations or interventions, so she was admitted.      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  Warfarin causes coagulopathy that increases bleeding risk.  Presented with acute neurologic deterioration with initial signs of coma with GCS as low as 4 in the ER but subsequently improved responsiveness although persistent expressive aphasia and left hemiplegia.  Radiographic findings demonstrated large right-sided intracranial intraparenchymal hemorrhage with mass effect including probable early obstructive hydrocephalus.  Intracranial hemorrhage appears to have been  associated with bleeding from AVM based on CTA radiographic findings.  INR was therapeutic at 2.0 from chronic warfarin therapy.  Despite initial treatments in the ER, radiographic findings deteriorated over the course of a few hours in the ER.  Both neurology and neurosurgery were consulted in the ER.  After discussion with the patient and her family, patient and family clearly expressed preference for comfort measures only and no aggressive interventions.  Since she was admitted, neurologic status has been generally stable with preserved responsiveness intermittently.  Doses of benzodiazepines and opiates do transiently reduce her responsiveness.  She has not had overt seizure activity.  -Continue n.p.o. except may take sublingual medication   -Ordered bedside dysphagia evaluation by nursing if patient is able to reliably participate at a time when she has not recently been treated with potentially sedating medication  -Not monitoring vital signs or neurologic status closely because of preference for comfort measures  -Continue scheduled dose of sublingual lorazepam for seizure prophylaxis  -May use additional doses of IV lorazepam as needed for actual seizure  -Continue sublingual Roxanol as needed for pain or dyspnea  -Continue sublingual Zyprexa as needed for agitation or delirium  -Continue oxygen as needed for dyspnea  -Order for palliative care consult canceled today because patient appears to be doing well clinically with current treatment plan  -Depending upon clinical course over the next 24 hours, anticipate consultation with case management for hospice and disposition planning if death is not imminent  -Not continuing chronic medications except those as needed for comfort such as inhaled medication for COPD  -Not continuing warfarin or monitoring INR    Hypertension  Carries diagnosis of essential hypertension chronically treated with losartan, metoprolol tartrate, and hydrochlorothiazide according to  outside medical records.  In context of acute intracranial hemorrhage, presented with severe hypertension blood pressure as high as 203/99 in ER.  While undergoing diagnostic evaluation and clarification of treatment plan, he was transiently treated with nicardipine infusion in ER with recovery of blood pressure to the normal range.  Once decision was made to proceed with comfort measures, nicardipine infusion was stopped since which time blood pressure has been mildly elevated.  -Not continuing chronic antihypertensive medications due to preference for comfort measures  -Not monitoring blood pressure due to preference for comfort measures    COPD  Respiratory distress  Abnormal lung findings on head and neck CTA  Known COPD and panlobular emphysema chronically treated with inhaled therapies including Spiriva and albuterol.  She does not require oxygen supplementation chronically.  Now presenting with clinical signs of respiratory distress in the context of life-threatening acute illness.  So far, she has not had signs of COPD exacerbation.  Incidentally, she was reported to have abnormal lung findings in the right lung with masslike tissue in the right hilum on head and neck CTA.  -Continue DuoNeb 4 times daily  -Use supplemental oxygen as needed and titrated for comfort  -Not pursuing additional evaluation for potential treatment of incidental abnormal radiographic findings in the lung and right hilum because of preference for comfort measures only    Hyperglycemia  Hypochloremia  Hypokalemia  In the ER she was incidentally found to have potassium 3.2, chloride 95, and random blood sugar 163 concerning for hypokalemia, hypochloremia, and hyperglycemia.  Suspect electrolyte abnormalities are due to chronic hydrochlorothiazide diuretic therapy.  She is not known to have diabetes, so moderate hyperglycemia could be due to physiologic stress.  -Because of preference for comfort measures only, not continuing to monitor  electrolytes or treat hypokalemia, hypochloremia, or hyperglycemia    Hypothyroidism  Chronic hypothyroidism normally treated with levothyroxine.  Currently n.p.o. due to intracranial bleeding with stroke symptoms with currently guarded prognosis for survival beyond more than a week or two  -Not continuing levothyroxine for now    Anxiety and depression  According to outside medical records, has known chronic anxiety treated with lorazepam as needed and known chronic depression treated with Remeron and possibly fluoxetine.  Current mood difficult to assess in the context of life-threatening acute illness and she presently is n.p.o.  -Not continuing chronic Remeron or fluoxetine at this time  -Treating for comfort as outlined above    Infrarenal abdominal aortic aneurysm  Hyperlipidemia  Known chronic hyperlipidemia and infrarenal abdominal aortic aneurysm.  Takes a statin chronically but is presently n.p.o.  -Not continuing statin at this time due to preference for comfort measures    Obesity  Appears obese with BMI 38 at recent office visit on October 17.  -No acute intervention anticipated          Diet: NPO for Medical/Clinical Reasons Except for: No Exceptions, Other; Specify: may use sublingual med administration    DVT Prophylaxis: VTE Prophylaxis contraindicated due to preference for comfort measures only  Salomon Catheter: Not present  Lines: None     Cardiac Monitoring: None  Code Status: No CPR- Do NOT Intubate      Clinically Significant Risk Factors Present on Admission        # Hypokalemia: Lowest K = 3.2 mmol/L in last 2 days, will replace as needed   # Hypochloremia: Lowest Cl = 95 mmol/L in last 2 days, will monitor as appropriate       # Drug Induced Coagulation Defect: home medication list includes an anticoagulant medication    # Hypertension: Noted on problem list                    Disposition Plan     Medically Ready for Discharge: Anticipated in 2-4 Days             Tarvis Ford,  MD  Hospitalist Service  [facility]  Securely message with [email or web address] (more info)  Text page via [employer] Paging/Directory   ______________________________________________________________________    Interval History   There were no significant overnight events.  Nursing reports that the patient remained responsive overnight, squeezing hand appropriately and opening eyes in response to voice.  Patient has also continued to try to vocalize responsively but speech remains incomprehensible.  She has been receiving scheduled dose of lorazepam and does become sleepier after those doses for a few hours.  She also has received Roxanol periodically.  She has not had witnessed seizure activity.  Family reports that the patient continues to be responsive today as well unless she has recently received potentially sedating medication.  There have not been signs of worsening respiratory distress or decreased respiratory effort.    Physical Exam                      General Appearance: Somnolent elderly woman without signs of acute distress resting in bed  Respiratory: Respiratory effort currently appears comfortable  Neuro: Not presently opening eyes, vocalizing, or squeezing hand in response to voice but had just received a dose of lorazepam approximately 30 minutes previously    Medical Decision Making             Data   No new data

## 2024-11-02 VITALS
DIASTOLIC BLOOD PRESSURE: 79 MMHG | OXYGEN SATURATION: 96 % | SYSTOLIC BLOOD PRESSURE: 167 MMHG | TEMPERATURE: 98.4 F | HEART RATE: 57 BPM | RESPIRATION RATE: 26 BRPM | WEIGHT: 208 LBS

## 2024-11-02 PROCEDURE — 99232 SBSQ HOSP IP/OBS MODERATE 35: CPT | Performed by: PEDIATRICS

## 2024-11-02 PROCEDURE — 120N000001 HC R&B MED SURG/OB

## 2024-11-02 PROCEDURE — 250N000013 HC RX MED GY IP 250 OP 250 PS 637: Performed by: PEDIATRICS

## 2024-11-02 RX ADMIN — LORAZEPAM 1 MG: 2 LIQUID ORAL at 21:22

## 2024-11-02 RX ADMIN — MORPHINE SULFATE 10 MG: 10 SOLUTION ORAL at 23:28

## 2024-11-02 RX ADMIN — LORAZEPAM 1 MG: 2 LIQUID ORAL at 10:27

## 2024-11-02 RX ADMIN — DEXTRAN 70, GLYCERIN, HYPROMELLOSE 2 DROP: 1; 2; 3 SOLUTION/ DROPS OPHTHALMIC at 16:54

## 2024-11-02 RX ADMIN — LORAZEPAM 1 MG: 2 LIQUID ORAL at 16:54

## 2024-11-02 RX ADMIN — DEXTRAN 70, GLYCERIN, HYPROMELLOSE 2 DROP: 1; 2; 3 SOLUTION/ DROPS OPHTHALMIC at 10:28

## 2024-11-02 RX ADMIN — MORPHINE SULFATE 10 MG: 10 SOLUTION ORAL at 18:25

## 2024-11-02 RX ADMIN — LORAZEPAM 1 MG: 2 LIQUID ORAL at 01:37

## 2024-11-02 ASSESSMENT — ACTIVITIES OF DAILY LIVING (ADL)
ADLS_ACUITY_SCORE: 0
DEPENDENT_IADLS:: CLEANING;COOKING;LAUNDRY;SHOPPING;MEAL PREPARATION;MEDICATION MANAGEMENT;MONEY MANAGEMENT;TRANSPORTATION;INCONTINENCE
ADLS_ACUITY_SCORE: 0

## 2024-11-02 NOTE — PLAN OF CARE
Goal Outcome Evaluation:                 Outcome Evaluation: Return home with Family. Enrolling in [facility]

## 2024-11-02 NOTE — PROGRESS NOTES
Formerly [facility]    Medicine Progress Note - Hospitalist Service    Date of Admission:  10/31/2024    Assessment & Plan   Ella Gonzalez is a 79 year old female with hypertension, history of venous thromboembolism with factor II deficiency for which she had been taking warfarin anticoagulation chronically, COPD, hypothyroidism, obesity, and chronic anxiety and depression who presented to the ER on 10/31/2024 due to a sudden change in her responsiveness.  After diagnostic evaluation in the ER, patient was found to have intraparenchymal intracranial hemorrhage with extension into the subarachnoid space and cerebral ventricles with vasogenic edema and midline shift.  Clinically, she presented with expressive aphasia and left hemiplegia and had a Brian Coma Scale score as low as 4 in the ER.  After discussion with neurology and neurosurgery as well as with the patient and her family, patient and her family expressed preference for comfort measures only rather than aggressive additional evaluations or interventions, so she was admitted.      As of 11/2, death does not appear imminent.  Family including her daughter Jewels with whom she had been living is expressing preference for the patient to be at home at the end of life.  Hospice care appears clinically appropriate.    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  Warfarin causes coagulopathy that increases bleeding risk.  Presented with acute neurologic deterioration with initial signs of coma with GCS as low as 4 in the ER but subsequently improved responsiveness although persistent expressive aphasia and  left hemiplegia.  Radiographic findings demonstrated large right-sided intracranial intraparenchymal hemorrhage with mass effect including probable early obstructive hydrocephalus.  Intracranial hemorrhage appears to have been associated with bleeding from AVM based on CTA radiographic findings.  INR was therapeutic at 2.0 from chronic warfarin therapy.  Despite initial treatments in the ER, radiographic findings deteriorated over the course of a few hours in the ER.  Both neurology and neurosurgery were consulted in the ER.  After discussion with the patient and her family, patient and family clearly expressed preference for comfort measures only and no aggressive interventions.  Since she was admitted, neurologic status has been generally stable with preserved responsiveness intermittently.  Doses of benzodiazepines and opiates do transiently reduce her responsiveness.  She has not had overt seizure activity.  Although she is tachypneic, she is not otherwise exhibiting signs of hemodynamic compromise.     -Ordered case management consult for assistance with arranging hospice care, anticipate discharge home to start hospice care at home per family preference once arrangements can be made  -Continue n.p.o. except may take sublingual medication   -Ordered bedside dysphagia evaluation by nursing if patient is able to reliably participate at a time when she has not recently been treated with potentially sedating medication presently,   -Not monitoring vital signs or neurologic status closely because of preference for comfort measures  -Continue scheduled dose of sublingual lorazepam for seizure prophylaxis  -May use additional doses of IV lorazepam as needed for actual seizure  -Continue sublingual Roxanol as needed for pain or dyspnea  -Continue sublingual Zyprexa as needed for agitation or delirium  -Continue oxygen as needed for dyspnea  -Depending upon clinical course over the next 24 hours, anticipate consultation  with case management for hospice and disposition planning if death is not imminent  -Not continuing chronic medications except those as needed for comfort such as inhaled medication for COPD  -Not continuing warfarin or monitoring INR    Hypertension  Carries diagnosis of essential hypertension chronically treated with losartan, metoprolol tartrate, and hydrochlorothiazide according to outside medical records.  In context of acute intracranial hemorrhage, presented with severe hypertension blood pressure as high as 203/99 in ER.  While undergoing diagnostic evaluation and clarification of treatment plan, he was transiently treated with nicardipine infusion in ER with recovery of blood pressure to the normal range.  Once decision was made to proceed with comfort measures, nicardipine infusion was stopped since which time blood pressure has been mildly elevated.  -Not continuing chronic antihypertensive medications due to preference for comfort measures  -Not monitoring blood pressure due to preference for comfort measures    COPD  Respiratory distress  Abnormal lung findings on head and neck CTA  Known COPD and panlobular emphysema chronically treated with inhaled therapies including Spiriva and albuterol.  She does not require oxygen supplementation chronically.  Now presenting with clinical signs of respiratory distress in the context of life-threatening acute illness.  So far, she has not had signs of COPD exacerbation.  Incidentally, she was reported to have abnormal lung findings in the right lung with masslike tissue in the right hilum on head and neck CTA.  -Continue DuoNeb 4 times daily  -Use supplemental oxygen as needed and titrated for comfort  -Not pursuing additional evaluation for potential treatment of incidental abnormal radiographic findings in the lung and right hilum because of preference for comfort measures only    Hyperglycemia  Hypochloremia  Hypokalemia  In the ER she was incidentally found to  have potassium 3.2, chloride 95, and random blood sugar 163 concerning for hypokalemia, hypochloremia, and hyperglycemia.  Suspect electrolyte abnormalities are due to chronic hydrochlorothiazide diuretic therapy.  She is not known to have diabetes, so moderate hyperglycemia could be due to physiologic stress.  -Because of preference for comfort measures only, not continuing to monitor electrolytes or treat hypokalemia, hypochloremia, or hyperglycemia    Hypothyroidism  Chronic hypothyroidism normally treated with levothyroxine.  Currently n.p.o. due to intracranial bleeding with stroke symptoms with currently guarded prognosis for survival beyond more than a week or two  -Not continuing levothyroxine for now    Anxiety and depression  According to outside medical records, has known chronic anxiety treated with lorazepam as needed and known chronic depression treated with Remeron and possibly fluoxetine.  Current mood difficult to assess in the context of life-threatening acute illness and she presently is n.p.o.  -Not continuing chronic Remeron or fluoxetine at this time  -Treating for comfort as outlined above    Infrarenal abdominal aortic aneurysm  Hyperlipidemia  Known chronic hyperlipidemia and infrarenal abdominal aortic aneurysm.  Takes a statin chronically but is presently n.p.o.  -Not continuing statin at this time due to preference for comfort measures    Obesity  Appears obese with BMI 38 at recent office visit on October 17.  -No acute intervention anticipated          Diet: NPO for Medical/Clinical Reasons Except for: No Exceptions, Other; Specify: may use sublingual med administration    DVT Prophylaxis: VTE Prophylaxis contraindicated due to end-of-life care  Salomon Catheter: Not present  Lines: None     Cardiac Monitoring: None  Code Status: No CPR- Do NOT Intubate      Clinically Significant Risk Factors                # Coagulation Defect: INR = 2.05 (Ref range: 0.85 - 1.15) and/or PTT = 31 Seconds  (Ref range: 22 - 38 Seconds), will monitor for bleeding    # Hypertension: Noted on problem list                      Disposition Plan     Medically Ready for Discharge: Ready Now             Travis Ford MD  Hospitalist Service  [facility]  Securely message with [employer] (more info)  Text page via [employer] Paging/Directory   ______________________________________________________________________    Interval History   There were no significant overnight events.  No seizure activity has been witnessed.  Family reports that the patient continues to vocalize or grunt incomprehensibly in apparent response to questions to her during conversation with family members.  Patient continues to have spontaneous movement of her right upper extremity although family is not sure whether she is following instructions and squeezing hands reliably to direction today.  Nursing reports the patient has not been awake enough to reliably tolerate an attempt at bedside dysphagia evaluation.  Nursing reports that the patient has seemed to be comfortable primarily with scheduled dosing of lorazepam.  Patient was given 1 dose of Roxanol yesterday morning but no other doses of opiates so far.  Nursing reports that the patient is continuing to have spontaneous urinary output.    Physical Exam                       General Appearance: Ill-appearing elderly woman lying in bed  Respiratory: Tachypneic with respiratory rate 30-40, not using accessory muscles, symmetric breath sounds, inspiratory crackles on the left, no wheezing  Cardiovascular: Regular rate and rhythm with normal heart rate, strong radial pulse, brisk capillary refill  GI: Nondistended abdomen, soft  Neuro: Appears somnolent but seems to vocalize incomprehensibly to verbal question although not otherwise follow instructions currently, pupils are dilated and symmetric, left side remains hemiplegic    Medical Decision Making             Data   No new  lab data

## 2024-11-02 NOTE — PLAN OF CARE
Goal Outcome Evaluation:      Plan of Care Reviewed With: patient, family    Overall Patient Progress: no changeOverall Patient Progress: no change     Pt sleeping comfortably, no S/S of pain. Pt repositioned Q2hr. External cath in place. Pt has some strength on R side but left side flaccid. Axillary temp was taken due to Pt feeling warm during reposition and assessment. Temp of 99, some covers removed and cool wash cloth placed on forehead. Temp decreased to 98.9.

## 2024-11-02 NOTE — CONSULTS
Care Management Initial Consult    General Information  Assessment completed with: Supriya Donis  Type of CM/SW Visit: Offer D/C Planning    Primary Care Provider verified and updated as needed: Yes : [facility]    Readmission within the last 30 days: no previous admission in last 30 days      Reason for Consult: discharge planning, end of life/hospice    Advance Care Planning:            Communication Assessment  Patient's communication style: spoken language (English or Bilingual)    Hearing Difficulty or Deaf: no   Wear Glasses or Blind: yes    Cognitive  Cognitive/Neuro/Behavioral: .WDL except  Level of Consciousness: obtunded, somnolent  Arousal Level: arouses to voice  Orientation: other (see comments)  Mood/Behavior: calm  Best Language: 0 - No aphasia  Speech: unable to speak    Living Environment:   People in home:  Supriya Donis, Grandson Sin  Current living Arrangements: house  --[facility]      Able to return to prior arrangements: yes  Living Arrangement Comments: Anticipating Pt to return back to home with daughter, enrolling in hospice    Family/Social Support:  Care provided by: self, child(wellington)  Provides care for: no one  Marital Status:     Support system: Children          Description of Support System: Supportive, Involved    Support Assessment: Adequate family and caregiver support, Adequate social supports    Current Resources:   Patient receiving home care services: No  Community Resources:    Equipment currently used at home: shower chair, walker, standard  Supplies currently used at home:      Employment/Financial:  Employment Status: retired        Financial Concerns:        Does the patient's insurance plan have a 3 day qualifying hospital stay waiver?  Yes     Which insurance plan 3 day waiver is available? Alternative insurance waiver    Will the waiver be used for post-acute placement? No    Lifestyle & Psychosocial Needs:  Social Drivers of Health     Food  Insecurity: Low Risk  (10/31/2024)    Food Insecurity     Within the past 12 months, did you worry that your food would run out before you got money to buy more?: No     Within the past 12 months, did the food you bought just not last and you didn t have money to get more?: No   Depression: Not at risk (9/12/2023)    [facility]     PHQ-2     PHQ2 Total: 1   Housing Stability: Low Risk  (10/31/2024)    Housing Stability     Do you have housing? : Yes     Are you worried about losing your housing?: No   Tobacco Use: Medium Risk (10/17/2024)    Received from [facility]     Patient History     Smoking Tobacco Use: Former     Smokeless Tobacco Use: Never     Passive Exposure: Not on file   Financial Resource Strain: Low Risk  (10/31/2024)    Financial Resource Strain     Within the past 12 months, have you or your family members you live with been unable to get utilities (heat, electricity) when it was really needed?: No   Alcohol Use: Not on file   Transportation Needs: Low Risk  (10/31/2024)    Transportation Needs     Within the past 12 months, has lack of transportation kept you from medical appointments, getting your medicines, non-medical meetings or appointments, work, or from getting things that you need?: No   Physical Activity: Not on file   Interpersonal Safety: Unknown (10/31/2024)    Interpersonal Safety     Do you feel physically and emotionally safe where you currently live?: Patient unable to answer     Within the past 12 months, have you been hit, slapped, kicked or otherwise physically hurt by someone?: Patient unable to answer     Within the past 12 months, have you been humiliated or emotionally abused in other ways by your partner or ex-partner?: Patient unable to answer   Stress: Not on file   Social Connections: Not on file   Health Literacy: Not on file       Functional Status:  Prior to admission patient needed assistance:   Dependent  "ADLs:: Dressing, Eating, Grooming, Transfers, Wheelchair-with assist, Toileting  Dependent IADLs:: Cleaning, Cooking, Laundry, Shopping, Meal Preparation, Medication Management, Money Management, Transportation, Incontinence  Assesssment of Functional Status:  (Comfort Care/End of Life)    Mental Health Status:  Mental Health Status: No Current Concerns       Chemical Dependency Status:  Chemical Dependency Status: No Current Concerns             Values/Beliefs:  Spiritual, Cultural Beliefs, [employer]ian Practices, Values that affect care: no  Description of Beliefs that Will Affect Care: nishi Huertas            Discussed  Partnership in Safe Discharge Planning  document with patient/family: Yes      Additional Information:  Referral received to assist with discharge planning and services.     CM met with the Pt's daughter Jewels to introduce self/role and to complete assessment.     Pt has been residing with the daughter. Daughter expressed that this event was very \"unexpected and caught her off-guard.\"     CM discussed options for end of life cares out of the hospital if deed appropriate by MD.   Daughter Jewels verbalized having a strong awareness as she had carried for her spouse during his his end of life in the home with [facility].     Dtr stated it would also be her plan to have the Pt back in the home and NOT at a SNF   Would like to have a referral back with [facility]     Daughter expressed that she was familiar with hospice and how hospice could be supported in the home.     Faxed referral to [facility] per daughter's request. --ACCEPTED   Discussed with Roxanne Braun # [phone]-.   Requested to have [facility] intake tomorrow 11/3   Roxanne will reach out to the daughter to arrange DME delivery       DME  Family plans to have a hospital bed delivered as the pt was sleeping on a recliner chair  ? Wheelchair and over the bed table.         Next Steps: Anticipating the " Pt will be stable enough for transfer tomorrow.     --Coordinating a consult with [facility] on Sunday for intake  Phone: [phone] Fax: [phone]      Transport: Ambulance/BLS stretcher   Time : ANTONY Medel  [facility] [phone]   [facility]  [phone]

## 2024-11-03 PROCEDURE — 250N000013 HC RX MED GY IP 250 OP 250 PS 637: Performed by: PEDIATRICS

## 2024-11-03 PROCEDURE — 99239 HOSP IP/OBS DSCHRG MGMT >30: CPT | Performed by: PEDIATRICS

## 2024-11-03 RX ORDER — MINERAL OIL/HYDROPHIL PETROLAT
OINTMENT (GRAM) TOPICAL PRN
Qty: 50 G | Refills: 0 | Status: SHIPPED | OUTPATIENT
Start: 2024-11-03

## 2024-11-03 RX ORDER — SALIVA STIMULANT COMB. NO.3
1 SPRAY, NON-AEROSOL (ML) MUCOUS MEMBRANE PRN
Qty: 44 ML | Refills: 0 | Status: SHIPPED | OUTPATIENT
Start: 2024-11-03

## 2024-11-03 RX ORDER — MORPHINE SULFATE 20 MG/ML
5 SOLUTION ORAL
Qty: 15 ML | Refills: 0 | Status: SHIPPED | OUTPATIENT
Start: 2024-11-03

## 2024-11-03 RX ORDER — LORAZEPAM 2 MG/ML
1 CONCENTRATE ORAL EVERY 6 HOURS
Qty: 30 ML | Refills: 0 | Status: SHIPPED | OUTPATIENT
Start: 2024-11-03

## 2024-11-03 RX ORDER — ATROPINE SULFATE 10 MG/ML
2 SOLUTION/ DROPS OPHTHALMIC 2 TIMES DAILY PRN
Qty: 2 ML | Refills: 0 | Status: SHIPPED | OUTPATIENT
Start: 2024-11-03

## 2024-11-03 RX ORDER — ONDANSETRON 4 MG/1
4 TABLET, ORALLY DISINTEGRATING ORAL EVERY 6 HOURS PRN
Qty: 10 TABLET | Refills: 0 | Status: SHIPPED | OUTPATIENT
Start: 2024-11-03

## 2024-11-03 RX ORDER — GLIPIZIDE 10 MG/1
1-2 TABLET ORAL DAILY PRN
Qty: 15 ML | Refills: 0 | Status: SHIPPED | OUTPATIENT
Start: 2024-11-03

## 2024-11-03 RX ORDER — ACETAMINOPHEN 650 MG/1
650 SUPPOSITORY RECTAL EVERY 4 HOURS PRN
Qty: 12 SUPPOSITORY | Refills: 0 | Status: SHIPPED | OUTPATIENT
Start: 2024-11-03

## 2024-11-03 RX ADMIN — MORPHINE SULFATE 10 MG: 10 SOLUTION ORAL at 10:57

## 2024-11-03 RX ADMIN — MORPHINE SULFATE 10 MG: 10 SOLUTION ORAL at 03:06

## 2024-11-03 RX ADMIN — LORAZEPAM 1 MG: 2 LIQUID ORAL at 10:20

## 2024-11-03 RX ADMIN — LORAZEPAM 1 MG: 2 LIQUID ORAL at 04:28

## 2024-11-03 ASSESSMENT — ACTIVITIES OF DAILY LIVING (ADL)
ADLS_ACUITY_SCORE: 0

## 2024-11-03 NOTE — PLAN OF CARE
Goal Outcome Evaluation:      Plan of Care Reviewed With: patient, family    Overall Patient Progress: no changeOverall Patient Progress: no change     PRN roxanol given at 2330 and 0300 for breathing and to manage pain prior to linen change and repos. Repos and oral done Q 2hrs. Scheduled ativan given. External cath changed.

## 2024-11-03 NOTE — PLAN OF CARE
Goal Outcome Evaluation:           Overall Patient Progress: no changeOverall Patient Progress: no change    Outcome Evaluation: pt continues with Comfort care measures. family present throughout the day. no new changes.

## 2024-11-03 NOTE — DISCHARGE SUMMARY
[facility]  Hospitalist Discharge Summary      Date of Admission:  10/31/2024  Date of Discharge:  11/3/2024  Discharging Provider: Travis Ford MD  Discharge Service: Hospitalist Service    Discharge Diagnoses   Principal Problem:    Intracranial hemorrhage, spontaneous intraparenchymal, due to cerebral AVM, acute (H)  Active Problems:    Comfort measures only status    Warfarin-induced coagulopathy (H)    Benign essential hypertension    COPD (chronic obstructive pulmonary disease) (H)    Expressive aphasia    Left hemiplegia (H)    Hyperglycemia    Hypokalemia    Hemorrhagic stroke (H)    Personal history of venous thrombosis and embolism    Hypothyroidism    Infrarenal abdominal aortic aneurysm (AAA) without rupture (H)    Recurrent major depressive disorder, in remission (H)    Panic anxiety syndrome    Hyperlipidemia    Factor II deficiency (H)    Obesity      Clinically Significant Risk Factors          Follow-ups Needed After Discharge   Follow-up Appointments       Follow-up and recommended labs and tests       RN hospice care team follow up at home after discharge                Discharge Disposition   Admited to hospice care.   Agency: [facility].  Discharged to home  Condition at discharge: Terminal    Hospital Course   Ella Gonzalez is a 79 year old female with hypertension, history of venous thromboembolism with factor II deficiency for which she had been taking warfarin anticoagulation chronically, COPD, hypothyroidism, obesity, and chronic anxiety and depression who presented to the ER on 10/31/2024 due to a sudden change in her responsiveness.  After diagnostic evaluation in the ER, patient was found to have intraparenchymal intracranial hemorrhage with extension into the subarachnoid space and cerebral ventricles with vasogenic edema and midline shift.  Clinically, she presented with expressive aphasia and left hemiplegia and had a Brian Coma Scale score as low  as 4 in the ER.  After discussion with neurology and neurosurgery as well as with the patient and her family, patient and her family expressed preference for comfort measures only rather than aggressive additional evaluations or interventions, so she was admitted.      As of 11/3, death did not appear imminent.  Family including her daughter Jewels with whom she had been living expressed preference for the patient to be at home at the end of life.  Hospice care appears clinically appropriate, so arrangements were made to start hospice care at home after discharge.    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  Warfarin causes coagulopathy that increases bleeding risk.  Presented with acute neurologic deterioration with initial signs of coma with GCS as low as 4 in the ER but subsequently improved responsiveness although persistent expressive aphasia and left hemiplegia.  Radiographic findings demonstrated large right-sided intracranial intraparenchymal hemorrhage with mass effect including probable early obstructive hydrocephalus.  Intracranial hemorrhage appears to have been associated with bleeding from AVM based on CTA radiographic findings.  INR was therapeutic at 2.0 from chronic warfarin therapy.  Despite initial treatments in the ER, radiographic findings deteriorated over the course of a few hours in the ER.  Both neurology and neurosurgery were consulted in the ER.  After discussion with the patient and her family, patient and family clearly expressed preference for comfort measures only and no aggressive interventions.  She was treated with comfort measures during  hospitalization including benzodiazepines, opiates, and oxygen supplementation.  After she was admitted, neurologic status slowly deteriorated and by discharge she no longer was responsive.  She did not have overt seizure activity.  After additional discussions with family, they expressed preference for the patient to discharge home to continue hospice care at home.  Case management team was consulted and assisted with making arrangements for initiation of hospice care at home after discharge.  Hospice referral was provided.      Hypertension  Carried diagnosis of essential hypertension chronically treated with losartan, metoprolol tartrate, and hydrochlorothiazide according to outside medical records.  In context of acute intracranial hemorrhage, she presented with severe hypertension blood pressure as high as 203/99 in ER.  While undergoing diagnostic evaluation and clarification of treatment plan, she was transiently treated with nicardipine infusion in ER with recovery of blood pressure to the normal range.  Once decision was made to proceed with comfort measures, nicardipine infusion was stopped after which blood pressure was mildly elevated.  Ongoing blood pressure monitoring and antihypertensive medication administration was not continued during hospitalization due to preference for comfort measures.    COPD  Respiratory distress  Abnormal lung findings on head and neck CTA  Known COPD and panlobular emphysema chronically treated with inhaled therapies including Spiriva and albuterol.  She had not required oxygen supplementation chronically.  Presented with clinical signs of respiratory distress in the context of life-threatening acute illness.  She did not have signs of COPD exacerbation.  Incidentally, she was reported to have abnormal lung findings in the right lung with masslike tissue in the right hilum on head and neck CTA.  In the context of end-of-life care, supplemental oxygen was used as needed and  titrated for comfort.  Additional evaluation or potential treatment of incidental abnormal radiographic findings in the lung and right hilum was not pursued because of preference for comfort measures only.    Hyperglycemia  Hypochloremia  Hypokalemia  In the ER she was incidentally found to have potassium 3.2, chloride 95, and random blood sugar 163 concerning for hypokalemia, hypochloremia, and hyperglycemia.  Suspect electrolyte abnormalities were due to chronic hydrochlorothiazide diuretic therapy.  She was not known to have diabetes, so moderate hyperglycemia may have been due to physiologic stress.  Because of preference for comfort measures only, electrolytes and blood sugars were not monitored or abnormalities treated during hospitalization.      Hypothyroidism  Chronic hypothyroidism normally treated with levothyroxine.  She was n.p.o. due to intracranial bleeding with stroke symptoms with guarded prognosis for survival beyond more than a week or two, so previous chronic levothyroxine was discontinued.    Anxiety and depression  According to outside medical records, she had known chronic anxiety treated with lorazepam as needed and known chronic depression treated with Remeron and possibly fluoxetine.  Mood was difficult to assess in the context of life-threatening acute illness with decreased responsiveness.  She remained n.p.o., so chronic doses of Remeron and/or fluoxetine were not continued.    Infrarenal abdominal aortic aneurysm  Hyperlipidemia  Known chronic hyperlipidemia and infrarenal abdominal aortic aneurysm and had been taking a statin chronically but was n.p.o. due to preference for comfort measures only and n.p.o. status, chronic statin was not continued.    Obesity  Appeared obese with BMI 38 at recent office visit on October 17.  No acute intervention for obesity was recommended during hospitalization.    Consultations This Hospital Stay   SPIRITUAL HEALTH SERVICES IP CONSULT  PALLIATIVE  CARE ADULT IP CONSULT  SPIRITUAL HEALTH SERVICES IP CONSULT  CARE MANAGEMENT / SOCIAL WORK IP CONSULT    Code Status   No CPR- Do NOT Intubate    Time Spent on this Encounter   I, Travis Ford MD, personally saw the patient today and spent greater than 30 minutes discharging this patient.       Travis Ford MD  [facility] 2A MEDICAL SURGICAL  [address]   [place] [address]  Phone: [phone]  ______________________________________________________________________    Physical Exam                         Unresponsive elderly woman, appears comfortable lying in bed  Normal respiratory rate with mild suprasternal retractions, diminished breath sounds throughout, clear lungs  Regular heart rate and rhythm, no loud gallop or murmur, strong radial pulse, normal capillary refill  No responsiveness to voice or touch, no vocalizations, no eye-opening, no spontaneous or purposeful movement, symmetric pupils midposition both reactive to light, hypotonic in all the limbs       Primary Care Physician   Physician No Ref-Primary    Discharge Orders      Hospice Referral      Reason for your hospital stay    Hospitalized due to bleeding in the brain and arrangements were made to start hospice and continue comfort care at home     Follow-up and recommended labs and tests     RN hospice care team follow up at home after discharge     Activity    Your activity upon discharge: activity as tolerated     Oxygen Adult/Peds    For hospice care     Diet    Follow this diet upon discharge: Current Diet:Orders Placed This Encounter      NPO for Medical/Clinical Reasons Except for: No Exceptions, Other; Specify: may use sublingual med administration       Significant Results and Procedures   Most Recent 3 CBC's:  Recent Labs   Lab Test 10/31/24  1025   WBC 7.1   HGB 14.4   MCV 98        Most Recent 3 BMP's:  Recent Labs   Lab Test 10/31/24  1025 10/31/24  1010     --    POTASSIUM 3.2*  --     CHLORIDE 95*  --    CO2 29  --    BUN 11.3  --    CR 0.69  --    ANIONGAP 12  --    MANDI 9.3  --    * 163*     Most Recent 3 INR's:  Recent Labs   Lab Test 10/31/24  1025   INR 2.05*        Results for orders placed or performed during the hospital encounter of 10/31/24   CT Head w/o Contrast    Narrative    CT SCAN OF THE HEAD WITHOUT CONTRAST   10/31/2024 10:20 AM     HISTORY: Code Stroke to evaluate for potential thrombolysis and  thrombectomy.     TECHNIQUE:  Axial images of the head and coronal reformations without  IV contrast material. Radiation dose for this scan was reduced using  automated exposure control, adjustment of the mA and/or kV according  to patient size, or iterative reconstruction technique.    COMPARISON: None.    FINDINGS: Large acute parenchymal hematoma centered in the right  frontal lobe measuring 5.6 x 4.7 x 3.8 cm. Surrounding vasogenic edema  resulting in local sulcal effacement and 8 mm leftward midline shift.  There is minimal subarachnoid extension of the hemorrhage into a few  right frontal sulci. Large volume intraventricular extension with  filling of portions of the lateral, third, and fourth ventricles.  Additional hemorrhage extends inferiorly around the medulla and  superior cervical spinal cord and extends through the foramen of  [place] and [place]. All ventricles appear somewhat dilated relative  to the degree of sulcal widening. Faint periventricular hypodensity is  present along the right frontal and occipital horns, concerning for  early transependymal flow of CSF. Superimposed presumed sequela of  chronic microvascular ischemic changes.    The visualized portions of the sinuses and mastoids appear normal. The  bony calvarium and bones of the skull base appear intact.       Impression    IMPRESSION:     1. Large acute parenchymal hemorrhage centered in the right frontal  lobe with minimal subarachnoid extension into the adjacent right  frontal sulci and large  volume intraventricular hemorrhage.  2. 8 mm leftward midline shift.  3. Ventricular caliber is greater than expected for the degree of  parenchymal volume loss. Faint hypodensity surrounding the frontal and  occipital horns, concerning for early transependymal flow of CSF. This  raises concern for developing obstructive hydrocephalus. Close  attention on follow-up is recommended.    These findings were communicated by phone to Dr. Banda at 10:35 AM  on 10/31/2024.    CATHIE HERNANDES MD         SYSTEM ID:  [id number]   CTA Head Neck with Contrast    Narrative    CT ANGIOGRAM OF THE HEAD AND NECK WITH CONTRAST  10/31/2024 11:10 AM     HISTORY: Code stroke to evaluate for potential thrombolysis and  thrombectomy.     TECHNIQUE: CT angiography with an injection of ISOVUE-370, 67mL IV  with scans through the head and neck. Images were transferred to a  separate 3-D workstation where multiplanar reformations and 3-D images  were created. Estimates of carotid stenoses are made relative to the  distal internal carotid artery diameters except as noted. Radiation  dose for this scan was reduced using automated exposure control,  adjustment of the mA and/or kV according to patient size, or iterative  reconstruction technique.    COMPARISON: None.     CT HEAD FINDINGS: No contrast enhancing lesions. Cerebral blood flow  is grossly normal.     CT ANGIOGRAM HEAD FINDINGS: Normal variant fetal origin of the left  posterior cerebral artery. Bilateral carotid siphon atherosclerotic  calcifications without luminal narrowing. The major intracranial  arteries including the proximal branches of the anterior cerebral,  middle cerebral, and posterior cerebral arteries appear patent without  vascular cutoff. No aneurysm identified. A few mildly prominent right  MCA branches are noted along the cortex and course superiorly to a  small area of somewhat tangled vessels, just lateral to the  parenchymal hemorrhage. This measures 13 x 9 mm  in axial dimension and  is concerning for a small nidus. Mildly prominent cortical draining  vein coursing anteriorly.    CT ANGIOGRAM NECK FINDINGS:   Normal origin of the great vessels from the aortic arch.     Right carotid artery: The right common and internal carotid arteries  are patent. Atherosclerotic disease at the carotid bifurcation and  proximal internal carotid artery with less than 50% stenosis by NASCET  criteria.     Left carotid artery: The left common and internal carotid arteries are  patent. Mild atherosclerotic disease at the carotid bifurcation and  proximal internal carotid artery without significant stenosis by  NASCET criteria.     Vertebral arteries: Dominant right vertebral artery. Congenitally  small left vertebral artery is diminutive after giving off the left  posterior inferior cerebellar artery. Vertebral arteries are patent  without evidence of dissection.      Other findings: Lobular opacification in the right lung in a finger in  glove distribution. Additional masslike tissue in the right hilum  suggestive of an enlarged lymph node.       Impression    IMPRESSION:  1. Findings concerning for a small arteriovenous malformation along  the lateral aspect of the right frontal lobe, just lateral to the  parenchymal hemorrhage. Right MCA feeding arteries, and a mildly  prominent draining cortical vein. Given proximity to the hemorrhage,  this is the likely etiology of the hemorrhage. Interventional  radiology consultation for angiogram is recommended for further  evaluation.  2. No large vessel occlusion or hemodynamically significant stenosis  throughout the [place] arteries.  3. No hemodynamically significant stenosis throughout the major neck  arteries.  4. Finger in glove pattern of opacification in the right lung,  concerning for an infectious process such as aspergillosis. Neoplastic  process is not excluded but considered less likely. Dedicated chest CT  is recommended to  evaluate the entire extent not visualized on this  examination.    These findings were communicated by phone to Dr. Banda at 11:52 AM  on 10/31/2024.    CATHIE HERNANDES MD         SYSTEM ID:  [id number]   CT Head w/o Contrast    Narrative    CT HEAD WITHOUT CONTRAST  October 31, 2024 2:52 PM     HISTORY: Only if within GOC: Four hour repeat CTH for stability,  followup on edema.       COMPARISON: 10/31/2024 at 1021 hours.    TECHNIQUE: Using multidetector thin collimation helical acquisition  technique, axial, coronal and sagittal CT images from the skull base  to the vertex were obtained without intravenous contrast.   (topogram) image(s) also obtained and reviewed. Dose reduction  techniques were used.      Impression    IMPRESSION:   1.  Intraparenchymal component of the right frontal lobe hemorrhage  measures 47 x 51 x 67 mm, slightly increased since comparison when  measured 47 x 46 x 64 mm. Degree of surrounding vasogenic edema and  right hemispheric sulcal effacement is similar. Leftward midline shift  measuring 7-8 mm is similar. Similar extensive intraventricular  hemorrhage throughout the lateral, third and fourth ventricles with  associated findings of obstructive hydrocephalus.  2.  Stable orbital structures, bony calvarium, paranasal sinuses and  mastoid air cells.    Findings discussed with Dr. Banda by me at 1457 hours on 10/31/2024.    NUNU CONNER DO         SYSTEM ID:  [id number]       Discharge Medications   Current Discharge Medication List        START taking these medications    Details   acetaminophen (TYLENOL) 650 MG suppository Place 1 suppository (650 mg) rectally every 4 hours as needed for mild pain or fever (temperature over 100 F).  Qty: 12 suppository, Refills: 0    Associated Diagnoses: Comfort measures only status      artificial saliva (BIOTENE MT) SOLN solution Take 1 spray by mouth as needed for dry mouth.  Qty: 44 mL, Refills: 0    Associated Diagnoses: Comfort measures  only status      artificial tears (GENTEAL) 0.1-0.2-0.3 % ophthalmic solution Place 1-2 drops into both eyes daily as needed for dry eyes.  Qty: 15 mL, Refills: 0    Associated Diagnoses: Comfort measures only status      atropine 1 % ophthalmic solution Place 2 drops under the tongue 2 times daily as needed for secretions.  Qty: 2 mL, Refills: 0    Associated Diagnoses: Comfort measures only status      LORazepam (ATIVAN) 2 MG/ML (HIGH CONC) oral solution Place 0.5 mLs (1 mg) under the tongue every 6 hours.  Qty: 30 mL, Refills: 0    Associated Diagnoses: Comfort measures only status      mineral oil-hydrophilic petrolatum (AQUAPHOR) external ointment Apply topically as needed for dry skin.  Qty: 50 g, Refills: 0    Associated Diagnoses: Comfort measures only status      morphine sulfate (ROXANOL) 20 mg/mL (HIGH CONC) soln Place 0.25 mLs (5 mg) under the tongue every 2 hours as needed for shortness of breath or pain.  Qty: 15 mL, Refills: 0    Associated Diagnoses: Comfort measures only status      ondansetron (ZOFRAN ODT) 4 MG ODT tab Place 1 tablet (4 mg) under the tongue every 6 hours as needed for nausea or vomiting.  Qty: 10 tablet, Refills: 0    Associated Diagnoses: Comfort measures only status           STOP taking these medications       warfarin ANTICOAGULANT (COUMADIN) 3 MG tablet Comments:   Reason for Stopping:         warfarin ANTICOAGULANT (COUMADIN) 3 MG tablet Comments:   Reason for Stopping:             Allergies   Not on File

## 2024-11-03 NOTE — PROGRESS NOTES
S-(situation): Patient discharged to home with hospice care via EMS with grand-daughter    B-(background): L sided paralysis and unresponsive    A-(assessment): Pt comfortable. Continues with comfort care measures. Turned and repo done, oral cares done. Grand daughter present at bedside.     R-(recommendations): Discharge instructions reviewed with Grand daughter. Listed belongings gathered and returned to patient. yes         Discharge Nursing Criteria:   Patient Education given and documented: (STROKE, CHF, Diabetes):  {Yes   Care Plan and Patient education resolved: Yes    New Medications- pt has been educated about purpose and side effects: Yes    Vaccines  Influenza status verified at discharge:  Yes        MISC  Prescriptions if needed, hard copies sent with patient  Yes  Home medications returned to patient: Yes  Medication Bin checked and emptied on discharge Yes  Patient reports post-discharge pain management plan is effective: Yes

## 2024-11-03 NOTE — PROGRESS NOTES
Care Management Discharge Note    Discharge Date:  11/03/24     Discharge Disposition: Home with [facility]    Discharge Services:  Hospice     Discharge DME: Hospital Bed    Discharge Transportation: agency    Private pay costs discussed: N/A- Pt has Medical Assistance     Does the patient's insurance plan have a 3 day qualifying hospital stay waiver?  Yes     Which insurance plan 3 day waiver is available? Alternative insurance waiver    Will the waiver be used for post-acute placement? No    Patient/family educated on Medicare website which has current facility and service quality ratings: yes    Education Provided on the Discharge Plan: Yes  Persons Notified of Discharge Plans: Daughter Jewels  Patient/Family in Agreement with the Plan: yes    Handoff Referral Completed: No, handoff not indicated or clinically appropriate    Additional Information:  Update received during IDT rounds. Informed Pt is medically appropriate to discharge to home and enroll into [facility]    DME (hospital bed, over the bed table) is scheduled to be delivered to the patient's home by 3pm today. Delivery time has changed due to  availability through [facility] per hospice. They will update once ETA is determined.     Roxanne--[facility] Intake spoke with the Pt's daughter Jewels. Addressed the Family's questions and needs for discharging to home with hospice.       [facility] is scheduled to enrol the pt today when the DME is confirmed to be in the home     Roxanne requesting to have a Ambulance BLS transport arranged around 3pm today.     Pt will need 3 days worth of comfort meds filled prior to discharge and sent home with the patient.     Family aware of transfer plan. Roxanne will continue to remain in contact with the family as they transition the patient home for hospice enrollment.       Discharge Plan: Discharging home today to enroll in [facility]     [facility]   Phone: [phone]    Fax: [phone]  (Requested a printed packet be sent with the patient)     Scheduled to enroll this afteroon/evening --upon the Pt's arrival time     Ambulance Transport via BLS Stretcher   Time : 3:00pm      ANTONY Lewis [phone]   [facility]  [phone]

## 2024-11-04 ENCOUNTER — PATIENT OUTREACH (OUTPATIENT)
Dept: CARE COORDINATION | Facility: CLINIC | Age: 79
End: 2024-11-04
Payer: COMMERCIAL

## 2024-11-04 NOTE — PROGRESS NOTES
[facility]    Background: Transitional Care Management program identified per system criteria and reviewed by [facility] team for possible outreach.    Assessment: Upon chart review, [facility] Team member will not proceed with patient outreach related to this episode of Transitional Care Management program due to reason below:    Patient has been discharged with Hospice Care    Patient discharged home with Hospice Care. No [facility] outreach call needed at this time.    Plan: Transitional Care Management episode addressed appropriately per reason noted above.      KHLOE Núñez  [phone]  [facility]     *Connected Care Resource Team does NOT follow patient ongoing. Referrals are identified based on internal discharge reports and the outreach is to ensure patient has an understanding of their discharge instructions.